# Patient Record
Sex: FEMALE | Race: BLACK OR AFRICAN AMERICAN | NOT HISPANIC OR LATINO | Employment: FULL TIME | URBAN - METROPOLITAN AREA
[De-identification: names, ages, dates, MRNs, and addresses within clinical notes are randomized per-mention and may not be internally consistent; named-entity substitution may affect disease eponyms.]

---

## 2023-04-05 ENCOUNTER — OFFICE VISIT (OUTPATIENT)
Dept: PRIMARY CARE | Facility: CLINIC | Age: 48
End: 2023-04-05
Payer: COMMERCIAL

## 2023-04-05 VITALS — HEART RATE: 76 BPM | DIASTOLIC BLOOD PRESSURE: 76 MMHG | SYSTOLIC BLOOD PRESSURE: 134 MMHG | RESPIRATION RATE: 12 BRPM

## 2023-04-05 DIAGNOSIS — J01.00 ACUTE MAXILLARY SINUSITIS, RECURRENCE NOT SPECIFIED: Primary | ICD-10-CM

## 2023-04-05 DIAGNOSIS — Z00.00 HEALTH CARE MAINTENANCE: ICD-10-CM

## 2023-04-05 DIAGNOSIS — E11.9 TYPE 2 DIABETES MELLITUS WITHOUT COMPLICATION, UNSPECIFIED WHETHER LONG TERM INSULIN USE (MULTI): ICD-10-CM

## 2023-04-05 PROCEDURE — 99213 OFFICE O/P EST LOW 20 MIN: CPT | Performed by: INTERNAL MEDICINE

## 2023-04-05 PROCEDURE — 3078F DIAST BP <80 MM HG: CPT | Performed by: INTERNAL MEDICINE

## 2023-04-05 PROCEDURE — 3075F SYST BP GE 130 - 139MM HG: CPT | Performed by: INTERNAL MEDICINE

## 2023-04-05 RX ORDER — DOXYCYCLINE 100 MG/1
100 CAPSULE ORAL 2 TIMES DAILY
Qty: 14 CAPSULE | Refills: 0 | Status: SHIPPED | OUTPATIENT
Start: 2023-04-05 | End: 2023-04-12

## 2023-04-05 NOTE — PROGRESS NOTES
Subjective   Patient ID: Pebbles Concepcion is a 47 y.o. female who presents for No chief complaint on file..    HPI sick visit same day no chest pain no shortness of breath no side effect with medication blood sugars have been around 100 no fever but has had sinus congestion and buildup no coughing with production bowels have been normal has tried multiple over-the-counter medications without success    Review of Systems    Objective   There were no vitals taken for this visit.    Physical Exam vital signs noted alert and oriented x3 NCAT no coryza nares heavy gray discharge OP benign erythema no exudate mild right-sided maxillary tenderness TM normal bilateral EAC clear bilateral positive AC nodes no JVD chest clear to auscultation CV regular rate and rhythm S1-S2 extremities no clubbing cyanosis or edema normal distal pulses    Assessment/Plan    impression diabetes diagnosis other diagnoses acute maxillary sinusitis  Plan okay for antibiotic take as directed see EMR nasal saline Halls lozenge Mucinex may discontinue any allergy medications does not appear to be allergies at this time monitor blood sugars continue with other medication recheck if no better and for regular visit

## 2023-10-04 PROBLEM — E06.3 HASHIMOTO'S THYROIDITIS: Status: ACTIVE | Noted: 2023-10-04

## 2023-10-04 PROBLEM — E05.00 GRAVES DISEASE: Status: ACTIVE | Noted: 2022-11-02

## 2023-10-04 PROBLEM — R81 GLUCOSURIA: Status: ACTIVE | Noted: 2023-10-04

## 2023-10-04 PROBLEM — R60.0 PERIPHERAL EDEMA: Status: ACTIVE | Noted: 2023-10-04

## 2023-10-04 PROBLEM — M79.643 HAND PAIN: Status: ACTIVE | Noted: 2023-10-04

## 2023-10-04 PROBLEM — R19.7 DIARRHEA: Status: ACTIVE | Noted: 2023-10-04

## 2023-10-04 PROBLEM — G54.0 THORACIC OUTLET SYNDROME: Status: ACTIVE | Noted: 2023-10-04

## 2023-10-04 PROBLEM — R60.9 PERIPHERAL EDEMA: Status: ACTIVE | Noted: 2023-10-04

## 2023-10-04 PROBLEM — E78.5 HYPERLIPIDEMIA: Status: ACTIVE | Noted: 2023-10-04

## 2023-10-04 PROBLEM — J30.2 SEASONAL ALLERGIC RHINITIS: Status: ACTIVE | Noted: 2022-11-02

## 2023-10-04 PROBLEM — D58.2 ELEVATED HEMOGLOBIN (CMS-HCC): Status: ACTIVE | Noted: 2023-10-04

## 2023-10-04 PROBLEM — R82.90 ABNORMAL URINE FINDING: Status: ACTIVE | Noted: 2023-10-04

## 2023-10-04 PROBLEM — H52.03 HYPEROPIA WITH PRESBYOPIA OF BOTH EYES: Status: ACTIVE | Noted: 2023-10-04

## 2023-10-04 PROBLEM — E11.319 DIABETIC RETINOPATHY (MULTI): Status: ACTIVE | Noted: 2023-10-04

## 2023-10-04 PROBLEM — E05.90 HYPERTHYROIDISM: Status: ACTIVE | Noted: 2023-10-04

## 2023-10-04 PROBLEM — E07.9 ASYMMETRICAL THYROID: Status: ACTIVE | Noted: 2023-10-04

## 2023-10-04 PROBLEM — S69.92XA LEFT WRIST INJURY: Status: ACTIVE | Noted: 2023-10-04

## 2023-10-04 PROBLEM — D50.9 ANEMIA, IRON DEFICIENCY: Status: ACTIVE | Noted: 2023-10-04

## 2023-10-04 PROBLEM — H52.11 MYOPIA OF RIGHT EYE: Status: ACTIVE | Noted: 2023-10-04

## 2023-10-04 PROBLEM — N39.0 URINARY TRACT INFECTION: Status: ACTIVE | Noted: 2023-10-04

## 2023-10-04 PROBLEM — E04.0 DIFFUSE GOITER: Status: ACTIVE | Noted: 2023-10-04

## 2023-10-04 PROBLEM — S63.502A LEFT WRIST SPRAIN: Status: ACTIVE | Noted: 2023-10-04

## 2023-10-04 PROBLEM — R53.83 FATIGUE: Status: ACTIVE | Noted: 2023-10-04

## 2023-10-04 PROBLEM — I10 HYPERTENSION: Status: ACTIVE | Noted: 2023-10-04

## 2023-10-04 PROBLEM — N92.0 MENORRHAGIA: Status: ACTIVE | Noted: 2023-10-04

## 2023-10-04 PROBLEM — E55.9 VITAMIN D DEFICIENCY: Status: ACTIVE | Noted: 2023-10-04

## 2023-10-04 PROBLEM — E11.9 TYPE 2 DIABETES MELLITUS (MULTI): Status: ACTIVE | Noted: 2022-11-02

## 2023-10-04 PROBLEM — R20.0 BILATERAL HAND NUMBNESS: Status: ACTIVE | Noted: 2023-10-04

## 2023-10-04 PROBLEM — D64.9 ANEMIA: Status: ACTIVE | Noted: 2023-10-04

## 2023-10-04 PROBLEM — G56.03 BILATERAL CARPAL TUNNEL SYNDROME: Status: ACTIVE | Noted: 2023-10-04

## 2023-10-04 PROBLEM — H52.4 HYPEROPIA WITH PRESBYOPIA OF BOTH EYES: Status: ACTIVE | Noted: 2023-10-04

## 2023-10-04 PROBLEM — E01.0 THYROMEGALY: Status: ACTIVE | Noted: 2023-10-04

## 2023-10-04 RX ORDER — FLUTICASONE PROPIONATE 50 MCG
2 SPRAY, SUSPENSION (ML) NASAL DAILY
COMMUNITY

## 2023-10-04 RX ORDER — BENZONATATE 100 MG/1
100 CAPSULE ORAL 3 TIMES DAILY PRN
COMMUNITY
Start: 2022-11-02

## 2023-10-04 RX ORDER — LORATADINE 10 MG/1
10 TABLET ORAL
COMMUNITY
Start: 2022-11-02 | End: 2023-11-02

## 2023-10-04 RX ORDER — METHIMAZOLE 5 MG/1
5 TABLET ORAL
COMMUNITY

## 2023-10-04 RX ORDER — PIOGLITAZONEHYDROCHLORIDE 45 MG/1
45 TABLET ORAL
COMMUNITY
Start: 2022-10-04 | End: 2024-04-29 | Stop reason: SDUPTHER

## 2023-10-04 RX ORDER — BLOOD-GLUCOSE METER
EACH MISCELLANEOUS
COMMUNITY
Start: 2022-01-14

## 2023-10-04 RX ORDER — PREDNISONE 10 MG/1
1 TABLET ORAL 2 TIMES DAILY
COMMUNITY
Start: 2022-11-10 | End: 2023-10-19 | Stop reason: ALTCHOICE

## 2023-10-04 RX ORDER — PIOGLITAZONEHYDROCHLORIDE 45 MG/1
1 TABLET ORAL DAILY
COMMUNITY
Start: 2017-01-31 | End: 2023-10-19 | Stop reason: SDUPTHER

## 2023-10-04 RX ORDER — SITAGLIPTIN 100 MG/1
1 TABLET, FILM COATED ORAL DAILY
COMMUNITY
Start: 2021-10-21 | End: 2023-10-19

## 2023-10-04 RX ORDER — GLIPIZIDE 5 MG/1
1 TABLET ORAL 2 TIMES DAILY
COMMUNITY
Start: 2022-06-07 | End: 2023-10-19

## 2023-10-04 RX ORDER — METOPROLOL TARTRATE 25 MG/1
1 TABLET, FILM COATED ORAL 2 TIMES DAILY
COMMUNITY
Start: 2021-01-02 | End: 2023-10-19 | Stop reason: ALTCHOICE

## 2023-10-04 RX ORDER — ACETAMINOPHEN 500 MG
500 TABLET ORAL
COMMUNITY
Start: 2002-12-19

## 2023-10-04 RX ORDER — METFORMIN HYDROCHLORIDE 500 MG/1
1000 TABLET ORAL 2 TIMES DAILY
COMMUNITY

## 2023-10-04 RX ORDER — METFORMIN HYDROCHLORIDE 500 MG/1
1000 TABLET ORAL
COMMUNITY
Start: 2022-10-04 | End: 2023-10-19 | Stop reason: SDUPTHER

## 2023-10-04 RX ORDER — BLOOD SUGAR DIAGNOSTIC
STRIP MISCELLANEOUS
COMMUNITY
Start: 2017-11-06 | End: 2023-10-19 | Stop reason: ALTCHOICE

## 2023-10-04 RX ORDER — FUROSEMIDE 20 MG/1
1 TABLET ORAL DAILY
COMMUNITY
Start: 2021-06-22

## 2023-10-04 RX ORDER — LISINOPRIL 10 MG/1
1 TABLET ORAL DAILY
COMMUNITY
Start: 2016-01-15 | End: 2023-10-19 | Stop reason: SINTOL

## 2023-10-18 ENCOUNTER — LAB (OUTPATIENT)
Dept: LAB | Facility: LAB | Age: 48
End: 2023-10-18
Payer: COMMERCIAL

## 2023-10-18 DIAGNOSIS — E11.9 TYPE 2 DIABETES MELLITUS WITHOUT COMPLICATIONS (MULTI): ICD-10-CM

## 2023-10-18 DIAGNOSIS — E05.90 THYROTOXICOSIS, UNSPECIFIED WITHOUT THYROTOXIC CRISIS OR STORM: Primary | ICD-10-CM

## 2023-10-18 LAB
ALBUMIN SERPL BCP-MCNC: 4 G/DL (ref 3.4–5)
ALP SERPL-CCNC: 88 U/L (ref 33–110)
ALT SERPL W P-5'-P-CCNC: 12 U/L (ref 7–45)
ANION GAP SERPL CALC-SCNC: 13 MMOL/L (ref 10–20)
AST SERPL W P-5'-P-CCNC: 15 U/L (ref 9–39)
BILIRUB SERPL-MCNC: 0.3 MG/DL (ref 0–1.2)
BUN SERPL-MCNC: 20 MG/DL (ref 6–23)
CALCIUM SERPL-MCNC: 9.1 MG/DL (ref 8.6–10.3)
CHLORIDE SERPL-SCNC: 100 MMOL/L (ref 98–107)
CHOLEST SERPL-MCNC: 200 MG/DL (ref 0–199)
CHOLESTEROL/HDL RATIO: 2.3
CO2 SERPL-SCNC: 28 MMOL/L (ref 21–32)
CREAT SERPL-MCNC: 0.7 MG/DL (ref 0.5–1.05)
EST. AVERAGE GLUCOSE BLD GHB EST-MCNC: 249 MG/DL
GFR SERPL CREATININE-BSD FRML MDRD: >90 ML/MIN/1.73M*2
GLUCOSE SERPL-MCNC: 233 MG/DL (ref 74–99)
HBA1C MFR BLD: 10.3 %
HDLC SERPL-MCNC: 87.3 MG/DL
LDLC SERPL CALC-MCNC: 103 MG/DL
NON HDL CHOLESTEROL: 113 MG/DL (ref 0–149)
POTASSIUM SERPL-SCNC: 4.1 MMOL/L (ref 3.5–5.3)
PROT SERPL-MCNC: 7.1 G/DL (ref 6.4–8.2)
SODIUM SERPL-SCNC: 137 MMOL/L (ref 136–145)
T4 FREE SERPL-MCNC: 1.03 NG/DL (ref 0.61–1.12)
TRIGL SERPL-MCNC: 50 MG/DL (ref 0–149)
TSH SERPL-ACNC: 3.05 MIU/L (ref 0.44–3.98)
VLDL: 10 MG/DL (ref 0–40)

## 2023-10-18 PROCEDURE — 84443 ASSAY THYROID STIM HORMONE: CPT

## 2023-10-18 PROCEDURE — 84445 ASSAY OF TSI GLOBULIN: CPT

## 2023-10-18 PROCEDURE — 36415 COLL VENOUS BLD VENIPUNCTURE: CPT

## 2023-10-18 PROCEDURE — 80061 LIPID PANEL: CPT

## 2023-10-18 PROCEDURE — 84480 ASSAY TRIIODOTHYRONINE (T3): CPT

## 2023-10-18 PROCEDURE — 83036 HEMOGLOBIN GLYCOSYLATED A1C: CPT

## 2023-10-18 PROCEDURE — 80053 COMPREHEN METABOLIC PANEL: CPT

## 2023-10-18 PROCEDURE — 84439 ASSAY OF FREE THYROXINE: CPT

## 2023-10-18 RX ORDER — LANCETS
EACH MISCELLANEOUS
COMMUNITY

## 2023-10-19 ENCOUNTER — OFFICE VISIT (OUTPATIENT)
Dept: ENDOCRINOLOGY | Facility: HOSPITAL | Age: 48
End: 2023-10-19
Payer: COMMERCIAL

## 2023-10-19 VITALS
SYSTOLIC BLOOD PRESSURE: 114 MMHG | HEIGHT: 62 IN | WEIGHT: 159 LBS | TEMPERATURE: 97.4 F | DIASTOLIC BLOOD PRESSURE: 67 MMHG | BODY MASS INDEX: 29.26 KG/M2 | HEART RATE: 90 BPM | OXYGEN SATURATION: 90 %

## 2023-10-19 DIAGNOSIS — E05.00 GRAVES DISEASE: ICD-10-CM

## 2023-10-19 DIAGNOSIS — E11.9 TYPE 2 DIABETES MELLITUS WITHOUT COMPLICATION, WITHOUT LONG-TERM CURRENT USE OF INSULIN (MULTI): Primary | ICD-10-CM

## 2023-10-19 LAB
GLUCOSE BLD MANUAL STRIP-MCNC: 283 MG/DL (ref 74–99)
T3 SERPL-MCNC: 113 NG/DL (ref 60–200)

## 2023-10-19 PROCEDURE — 3078F DIAST BP <80 MM HG: CPT | Performed by: STUDENT IN AN ORGANIZED HEALTH CARE EDUCATION/TRAINING PROGRAM

## 2023-10-19 PROCEDURE — 36416 COLLJ CAPILLARY BLOOD SPEC: CPT | Performed by: STUDENT IN AN ORGANIZED HEALTH CARE EDUCATION/TRAINING PROGRAM

## 2023-10-19 PROCEDURE — 82947 ASSAY GLUCOSE BLOOD QUANT: CPT | Performed by: STUDENT IN AN ORGANIZED HEALTH CARE EDUCATION/TRAINING PROGRAM

## 2023-10-19 PROCEDURE — 3046F HEMOGLOBIN A1C LEVEL >9.0%: CPT | Performed by: STUDENT IN AN ORGANIZED HEALTH CARE EDUCATION/TRAINING PROGRAM

## 2023-10-19 PROCEDURE — 3049F LDL-C 100-129 MG/DL: CPT | Performed by: STUDENT IN AN ORGANIZED HEALTH CARE EDUCATION/TRAINING PROGRAM

## 2023-10-19 PROCEDURE — 3074F SYST BP LT 130 MM HG: CPT | Performed by: STUDENT IN AN ORGANIZED HEALTH CARE EDUCATION/TRAINING PROGRAM

## 2023-10-19 PROCEDURE — 99215 OFFICE O/P EST HI 40 MIN: CPT | Performed by: STUDENT IN AN ORGANIZED HEALTH CARE EDUCATION/TRAINING PROGRAM

## 2023-10-19 RX ORDER — LANCETS
EACH MISCELLANEOUS
Qty: 90 EACH | Refills: 11 | Status: SHIPPED | OUTPATIENT
Start: 2023-10-19

## 2023-10-19 RX ORDER — BLOOD SUGAR DIAGNOSTIC
STRIP MISCELLANEOUS
Qty: 90 STRIP | Refills: 11 | Status: SHIPPED | OUTPATIENT
Start: 2023-10-19

## 2023-10-19 RX ORDER — SEMAGLUTIDE 1.34 MG/ML
INJECTION, SOLUTION SUBCUTANEOUS
Qty: 0.01 G | Refills: 0 | Status: SHIPPED | OUTPATIENT
Start: 2023-10-19 | End: 2023-12-18

## 2023-10-19 RX ORDER — DEXTROSE 4 G
TABLET,CHEWABLE ORAL
Qty: 1 EACH | Refills: 0 | Status: SHIPPED | OUTPATIENT
Start: 2023-10-19

## 2023-10-19 SDOH — ECONOMIC STABILITY: FOOD INSECURITY: WITHIN THE PAST 12 MONTHS, YOU WORRIED THAT YOUR FOOD WOULD RUN OUT BEFORE YOU GOT MONEY TO BUY MORE.: NEVER TRUE

## 2023-10-19 SDOH — ECONOMIC STABILITY: FOOD INSECURITY: WITHIN THE PAST 12 MONTHS, THE FOOD YOU BOUGHT JUST DIDN'T LAST AND YOU DIDN'T HAVE MONEY TO GET MORE.: NEVER TRUE

## 2023-10-19 ASSESSMENT — ENCOUNTER SYMPTOMS
NECK PAIN: 0
POLYDIPSIA: 0
WHEEZING: 0
EYE DISCHARGE: 0
OCCASIONAL FEELINGS OF UNSTEADINESS: 0
FLANK PAIN: 0
FREQUENCY: 0
COUGH: 0
DIFFICULTY URINATING: 0
ACTIVITY CHANGE: 1
VOMITING: 0
ABDOMINAL DISTENTION: 0
LIGHT-HEADEDNESS: 0
FATIGUE: 0
NAUSEA: 0
ABDOMINAL PAIN: 0
DIARRHEA: 0
CONFUSION: 0
NECK STIFFNESS: 0
CONSTIPATION: 0
APPETITE CHANGE: 0
NUMBNESS: 0
POLYPHAGIA: 0
SHORTNESS OF BREATH: 0
DIZZINESS: 0
AGITATION: 0
DEPRESSION: 0
CHEST TIGHTNESS: 0
CHILLS: 0
LOSS OF SENSATION IN FEET: 0
HALLUCINATIONS: 0
TREMORS: 0
COLOR CHANGE: 0

## 2023-10-19 ASSESSMENT — LIFESTYLE VARIABLES
AUDIT-C TOTAL SCORE: 0
HOW MANY STANDARD DRINKS CONTAINING ALCOHOL DO YOU HAVE ON A TYPICAL DAY: PATIENT DOES NOT DRINK
HOW OFTEN DO YOU HAVE SIX OR MORE DRINKS ON ONE OCCASION: NEVER
HOW OFTEN DO YOU HAVE A DRINK CONTAINING ALCOHOL: NEVER
SKIP TO QUESTIONS 9-10: 1

## 2023-10-19 ASSESSMENT — PATIENT HEALTH QUESTIONNAIRE - PHQ9
SUM OF ALL RESPONSES TO PHQ9 QUESTIONS 1 & 2: 0
2. FEELING DOWN, DEPRESSED OR HOPELESS: NOT AT ALL
1. LITTLE INTEREST OR PLEASURE IN DOING THINGS: NOT AT ALL

## 2023-10-19 ASSESSMENT — PAIN SCALES - GENERAL: PAINLEVEL: 0-NO PAIN

## 2023-10-19 NOTE — PROGRESS NOTES
Subjective   Pebbles Concepcion is a 48 y.o. female who presents for follow up for Type 2 diabetes mellitus and hyperthyroidism  Lab Results   Component Value Date    HGBA1C 10.3 (H) 10/18/2023      Mrs. Concepcion is a 48 year old lady with history of diabetes mellitus type 2 hyperlipidemia low vitamin D hypertension who was referred by Dr. Ok Walker for evaluation of goiter and abnormal TFTs coming for follow up.    # Graves Disease  Was started on methimazole in July 2021. at that time TSH < 0.01 and FT4: 3.13 and T3: 237 TSI was 3.9  Was on methimazole 20 mg BID and metop 25 mg BID   Labs done in October showed a TSH < 0.01, FT4: 0.87 and FT4: 104.  Was on methimazole 10 mg 2 tabs in am and 1 in pm since October.  Blood tests TSH: 111 and FT4: < 0.25 on Friday May 3rd so we asked patient to hold methimazole  Methimazole was resumed in June patient did not follow-up until November blood work in November on methimazole 10 mg was TSH of 0.43 free T4 of 0.77 and T3 of 110 TSI was 2.4 we decrease methimazole to 5 mg repeated after 6 weeks TSH was 2.58 Free T4 0.85 and T3 123    Currently on methimazole 2.5 mg. TSH: 3.05, FT4: 1.03, T3: 113 TSI pending  Energy: low feels tired most of the time  Ran out of Januvia ( couple month)  Hasn't been eating well recently has been cutting down on Junk  Gained 16 lbs   No palpitations  No tremors  Periods twice a year  BM okay   Follows with ophthalmology joycelyn  Not seeing well   Has peripheral vision only in the left and right is her primary eye for vision and currently blurry      # T2DM  Diabetic a1c: 10.3 % on metformin 1000 mg BID and pioglitazone 45 mg  Does not check BG at home.  No frequent yeat infection No hx of UTI  Hx of pancreatitis years and years ago (discussed with patient risk with GLP1RA)  Diet:   Breakfast: Eggs with meat cereal  Lunch: salad or burger ot chicken sandwich  Dinner: chicken fish or pasta  Recently has been eating cookies cake (due to bday  "event)  Last ophthalmology recent  Numbness and tingling in her hands the whole hands sometimes.  Improved from before  Has been on lisinopril for years stopped in June cough started afterwards now no cough    Review of Systems   Constitutional:  Positive for activity change. Negative for appetite change, chills and fatigue.   HENT:  Negative for congestion.    Eyes:  Negative for discharge.   Respiratory:  Negative for cough, chest tightness, shortness of breath and wheezing.    Cardiovascular:  Negative for chest pain.   Gastrointestinal:  Negative for abdominal distention, abdominal pain, constipation, diarrhea, nausea and vomiting.   Endocrine: Negative for cold intolerance, heat intolerance, polydipsia, polyphagia and polyuria.   Genitourinary:  Negative for difficulty urinating, flank pain, frequency and urgency.   Musculoskeletal:  Negative for neck pain and neck stiffness.   Skin:  Negative for color change and rash.   Neurological:  Negative for dizziness, tremors, syncope, light-headedness and numbness.   Psychiatric/Behavioral:  Negative for agitation, confusion and hallucinations.        Objective   /67 (BP Location: Left arm, Patient Position: Sitting, BP Cuff Size: Adult)   Pulse 90   Temp 36.3 °C (97.4 °F) (Temporal)   Ht 1.575 m (5' 2\")   Wt 72.1 kg (159 lb)   SpO2 90%   BMI 29.08 kg/m²   Physical Exam  Constitutional:       General: She is not in acute distress.     Appearance: Normal appearance.   Eyes:      Extraocular Movements: Extraocular movements intact.      Pupils: Pupils are equal, round, and reactive to light.   Neck:      Comments: ~ 25 g rubbery thyroid no palpable nodules  Cardiovascular:      Rate and Rhythm: Normal rate and regular rhythm.   Pulmonary:      Effort: Pulmonary effort is normal. No respiratory distress.      Breath sounds: Normal breath sounds.   Abdominal:      General: Bowel sounds are normal.      Palpations: Abdomen is soft.      Tenderness: There is no " "abdominal tenderness.   Skin:     Coloration: Skin is not jaundiced or pale.      Findings: No erythema or rash.   Neurological:      General: No focal deficit present.      Mental Status: She is alert and oriented to person, place, and time.      Deep Tendon Reflexes: Reflexes normal.   Psychiatric:         Mood and Affect: Mood normal.         Behavior: Behavior normal.       Lab Review  Glucose (mg/dL)   Date Value   10/18/2023 233 (H)   12/31/2022 178 (H)   11/10/2022 134 (H)   06/17/2022 121 (H)     Hemoglobin A1C (%)   Date Value   10/18/2023 10.3 (H)   06/03/2022 12.9 (A)   10/15/2021 9.4 (A)   04/07/2021 7.4     Bicarbonate (mmol/L)   Date Value   10/18/2023 28   12/31/2022 27   11/10/2022 25   06/17/2022 27     Urea Nitrogen (mg/dL)   Date Value   10/18/2023 20   12/31/2022 14   11/10/2022 12   06/17/2022 21     Creatinine (mg/dL)   Date Value   10/18/2023 0.70   12/31/2022 0.73   11/10/2022 0.61   06/17/2022 0.95     Lab Results   Component Value Date    CHOL 200 (H) 10/18/2023    CHOL 160 11/19/2020    CHOL 225 (H) 09/20/2019     Lab Results   Component Value Date    HDL 87.3 10/18/2023    HDL 81.6 11/19/2020    HDL 85.4 09/20/2019     Lab Results   Component Value Date    LDLCALC 103 (H) 10/18/2023     Lab Results   Component Value Date    TRIG 50 10/18/2023    TRIG 60 11/19/2020    TRIG 44 09/20/2019     No components found for: \"CHOLHDL\"   Lab Results   Component Value Date    TSH 3.05 10/18/2023     No results found for: \"ALBUR\", \"[id number]\"     Health Maintenance:       Assessment/Plan   Mrs. Concepcion is a 48 year old lady with history of diabetes mellitus type 2 hyperlipidemia low vitamin D hypertension who was referred by Dr. Ok Walker for evaluation of goiter and abnormal TFTs coming for follow up.    # Graves Disease  Was started on methimazole in July 2021. at that time TSH < 0.01 and FT4: 3.13 and T3: 237 TSI was 3.9  Was on methimazole 20 mg BID and metop 25 mg BID   Labs done in October showed " a TSH < 0.01, FT4: 0.87 and FT4: 104.  Was on methimazole 10 mg 2 tabs in am and 1 in pm since October.  Blood tests TSH: 111 and FT4: < 0.25 on Friday May 3rd so we asked patient to hold methimazole  Methimazole was resumed in June patient did not follow-up until November blood work in November on methimazole 10 mg was TSH of 0.43 free T4 of 0.77 and T3 of 110 TSI was 2.4 we decrease methimazole to 5 mg repeated after 6 weeks TSH was 2.58 Free T4 0.85 and T3 123    Currently on methimazole 2.5 mg. TSH: 3.05, FT4: 1.03, T3: 113 TSI pending  Gained 16 lbs   Clinically euthyroid  Plan:  Decrease methimazole to 2.5 mg 4 times weekly.  If TSI is negative we will stop it and repeat in 4 weeks  If TSI still positive we will repeat in 3 months    # T2DM  Diabetic a1c: 10.3 % on metformin 1000 mg BID and pioglitazone 45 mg  Ran out of JAnuvia  Does not check BG at home.  No frequent yeast infection No hx of UTI  Hx of pancreatitis years and years ago (discussed with patient risk with GLP1RA)  Last ophthalmology recent  Numbness and tingling in her hands the whole hands sometimes.  Improved from before  Was been on lisinopril for years stopped in June cough started afterwards now no cough  Plan:  Continue Metformin and pioglitazone  Start Ozempic 0.25 mg once weekly injection week 1 through week 4 if tolerated increase to 0.5 mg weekly during week 5 to week 8, if tolerated increase to 1mg week 9 and continue with 1 mg until seen  If ozempic is not approved start trulicity 0.75 mg once weekly  Start Jardiance 25 mg daily  Check BG 2-3 times a day  Continue to follow with ophthalmology    Blood work in 3 months  Follow up in 3 months

## 2023-10-19 NOTE — PATIENT INSTRUCTIONS
For your thyroid  Decrease methimazole to 2.5 mg 4 times weekly.  If TSI is negative we will stop it and repeat in 4 weeks  If TSI still positive we will repeat in 3 months    For your Diabetes  Continue Metformin and pioglitazone  Start Ozempic 0.25 mg once weekly injection week 1 through week 4 if tolerated increase to 0.5 mg weekly during week 5 to week 8, if tolerated increase to 1mg week 9 and continue with 1 mg until seen  If ozempic is not approved start trulicity 0.75 mg once weekly  Start Jardiance 25 mg daily  Check BG 2-3 times a day  Continue to follow with ophthalmology    Blood work in 3 months  Follow up in 3 months

## 2023-10-23 DIAGNOSIS — E11.9 TYPE 2 DIABETES MELLITUS WITHOUT COMPLICATION, WITHOUT LONG-TERM CURRENT USE OF INSULIN (MULTI): ICD-10-CM

## 2023-10-24 RX ORDER — SEMAGLUTIDE 0.68 MG/ML
INJECTION, SOLUTION SUBCUTANEOUS
Qty: 3 ML | Refills: 0 | OUTPATIENT
Start: 2023-10-24

## 2023-10-25 LAB — TSI SER-ACNC: <1 TSI INDEX

## 2023-11-07 ENCOUNTER — TELEPHONE (OUTPATIENT)
Dept: ENDOCRINOLOGY | Facility: HOSPITAL | Age: 48
End: 2023-11-07
Payer: COMMERCIAL

## 2023-11-07 DIAGNOSIS — E05.00 GRAVES DISEASE: Primary | ICD-10-CM

## 2023-11-07 NOTE — TELEPHONE ENCOUNTER
Left voicemail to deliver attached message from md    ----- Message from Bassem Latham MD sent at 11/7/2023  3:47 PM EST -----  Please inform patient to stop her methimazole and repeat blood work in 4 weeks

## 2023-11-27 ENCOUNTER — APPOINTMENT (OUTPATIENT)
Dept: OPHTHALMOLOGY | Facility: CLINIC | Age: 48
End: 2023-11-27
Payer: COMMERCIAL

## 2023-12-01 DIAGNOSIS — E11.9 TYPE 2 DIABETES MELLITUS WITHOUT COMPLICATION, UNSPECIFIED WHETHER LONG TERM INSULIN USE (MULTI): ICD-10-CM

## 2023-12-05 RX ORDER — SEMAGLUTIDE 0.68 MG/ML
0.5 INJECTION, SOLUTION SUBCUTANEOUS
Qty: 3 ML | Refills: 3 | Status: SHIPPED | OUTPATIENT
Start: 2023-12-05 | End: 2024-03-07 | Stop reason: ALTCHOICE

## 2023-12-05 RX ORDER — PEN NEEDLE, DIABETIC 30 GX3/16"
1 NEEDLE, DISPOSABLE MISCELLANEOUS
Qty: 30 EACH | Refills: 3 | Status: SHIPPED | OUTPATIENT
Start: 2023-12-05

## 2024-01-15 ENCOUNTER — APPOINTMENT (OUTPATIENT)
Dept: PRIMARY CARE | Facility: CLINIC | Age: 49
End: 2024-01-15
Payer: COMMERCIAL

## 2024-01-22 ENCOUNTER — APPOINTMENT (OUTPATIENT)
Dept: ENDOCRINOLOGY | Facility: HOSPITAL | Age: 49
End: 2024-01-22
Payer: COMMERCIAL

## 2024-02-05 ENCOUNTER — OFFICE VISIT (OUTPATIENT)
Dept: PRIMARY CARE | Facility: CLINIC | Age: 49
End: 2024-02-05
Payer: COMMERCIAL

## 2024-02-05 VITALS — DIASTOLIC BLOOD PRESSURE: 72 MMHG | BODY MASS INDEX: 27.44 KG/M2 | WEIGHT: 150 LBS | SYSTOLIC BLOOD PRESSURE: 126 MMHG

## 2024-02-05 DIAGNOSIS — J01.00 ACUTE NON-RECURRENT MAXILLARY SINUSITIS: ICD-10-CM

## 2024-02-05 DIAGNOSIS — R05.3 CHRONIC COUGH: ICD-10-CM

## 2024-02-05 DIAGNOSIS — Z00.00 HEALTH CARE MAINTENANCE: Primary | ICD-10-CM

## 2024-02-05 PROCEDURE — 3078F DIAST BP <80 MM HG: CPT | Performed by: INTERNAL MEDICINE

## 2024-02-05 PROCEDURE — 99213 OFFICE O/P EST LOW 20 MIN: CPT | Performed by: INTERNAL MEDICINE

## 2024-02-05 PROCEDURE — 3074F SYST BP LT 130 MM HG: CPT | Performed by: INTERNAL MEDICINE

## 2024-02-05 RX ORDER — AZITHROMYCIN 250 MG/1
TABLET, FILM COATED ORAL
Qty: 6 TABLET | Refills: 0 | Status: SHIPPED | OUTPATIENT
Start: 2024-02-05 | End: 2024-02-10

## 2024-02-05 NOTE — PROGRESS NOTES
Subjective   Patient ID: Pebbles Concepcion is a 48 y.o. female who presents for No chief complaint on file..    HPI follow-up visit and sick visit same day after hours no staff no chest pain no shortness of breath 6+ weeks of coughing function infection, review of the images been coughing some sinus has been using present diet.  Close on Jardiance metformin has lost weight no polyuria polydipsia has blood work pending has not tried much else of over-the-counter medications except for lozenges bowels normal no dysuria    Review of Systems    Objective   There were no vitals taken for this visit.    Physical Exam vital signs noted alert and oriented x 3 NCAT no coryza nares heavy clear discharge OP benign TM normal bilateral EAC clear bilateral no AC nodes no JVD mild goiter chest clear to auscultation  Weak upper bronchial breath sounds without wheezing CV regular rate and rhythm S1-S2 extremities no clubbing cyanosis or edema normal distal pulses    Assessment/Plan impression cough acute on chronic maxillary sinusitis or sinobronchitis other diagnoses plan okay for Mucinex or throat lozenges increase water consumption okay for prescription Zithromax 250 mg tablet take 2 tabs first day 1 patient next 4 days #1 traditional Z-Nickolas and 0 refills continue with other medications follow-up for blood work follow-up with endocrinology then recheck for regular visit Endor if no better and/or ENT and/or films

## 2024-03-04 ENCOUNTER — LAB (OUTPATIENT)
Dept: LAB | Facility: LAB | Age: 49
End: 2024-03-04
Payer: COMMERCIAL

## 2024-03-04 DIAGNOSIS — E05.00 GRAVES DISEASE: ICD-10-CM

## 2024-03-04 DIAGNOSIS — E11.9 TYPE 2 DIABETES MELLITUS WITHOUT COMPLICATION, WITHOUT LONG-TERM CURRENT USE OF INSULIN (MULTI): ICD-10-CM

## 2024-03-04 LAB
ALBUMIN SERPL BCP-MCNC: 3.9 G/DL (ref 3.4–5)
ANION GAP SERPL CALC-SCNC: 13 MMOL/L (ref 10–20)
BUN SERPL-MCNC: 18 MG/DL (ref 6–23)
CALCIUM SERPL-MCNC: 9.4 MG/DL (ref 8.6–10.3)
CHLORIDE SERPL-SCNC: 100 MMOL/L (ref 98–107)
CO2 SERPL-SCNC: 28 MMOL/L (ref 21–32)
CREAT SERPL-MCNC: 0.7 MG/DL (ref 0.5–1.05)
EGFRCR SERPLBLD CKD-EPI 2021: >90 ML/MIN/1.73M*2
GLUCOSE SERPL-MCNC: 144 MG/DL (ref 74–99)
PHOSPHATE SERPL-MCNC: 4.1 MG/DL (ref 2.5–4.9)
POTASSIUM SERPL-SCNC: 4.3 MMOL/L (ref 3.5–5.3)
SODIUM SERPL-SCNC: 137 MMOL/L (ref 136–145)
T4 FREE SERPL-MCNC: 0.94 NG/DL (ref 0.61–1.12)
TSH SERPL-ACNC: 3.42 MIU/L (ref 0.44–3.98)

## 2024-03-04 PROCEDURE — 80069 RENAL FUNCTION PANEL: CPT

## 2024-03-04 PROCEDURE — 84480 ASSAY TRIIODOTHYRONINE (T3): CPT

## 2024-03-04 PROCEDURE — 84439 ASSAY OF FREE THYROXINE: CPT

## 2024-03-04 PROCEDURE — 83036 HEMOGLOBIN GLYCOSYLATED A1C: CPT

## 2024-03-04 PROCEDURE — 36415 COLL VENOUS BLD VENIPUNCTURE: CPT

## 2024-03-04 PROCEDURE — 84443 ASSAY THYROID STIM HORMONE: CPT

## 2024-03-05 ENCOUNTER — LAB (OUTPATIENT)
Dept: LAB | Facility: LAB | Age: 49
End: 2024-03-05
Payer: COMMERCIAL

## 2024-03-05 DIAGNOSIS — E05.00 GRAVES DISEASE: ICD-10-CM

## 2024-03-05 DIAGNOSIS — E11.9 TYPE 2 DIABETES MELLITUS WITHOUT COMPLICATION, WITHOUT LONG-TERM CURRENT USE OF INSULIN (MULTI): ICD-10-CM

## 2024-03-05 LAB
CREAT UR-MCNC: 36 MG/DL (ref 20–320)
EST. AVERAGE GLUCOSE BLD GHB EST-MCNC: 146 MG/DL
HBA1C MFR BLD: 6.7 %
MICROALBUMIN UR-MCNC: 7.9 MG/L
MICROALBUMIN/CREAT UR: 21.9 UG/MG CREAT
T3 SERPL-MCNC: 93 NG/DL (ref 60–200)

## 2024-03-05 PROCEDURE — 82043 UR ALBUMIN QUANTITATIVE: CPT

## 2024-03-05 PROCEDURE — 82570 ASSAY OF URINE CREATININE: CPT

## 2024-03-07 ENCOUNTER — OFFICE VISIT (OUTPATIENT)
Dept: ENDOCRINOLOGY | Facility: HOSPITAL | Age: 49
End: 2024-03-07
Payer: COMMERCIAL

## 2024-03-07 VITALS
SYSTOLIC BLOOD PRESSURE: 154 MMHG | DIASTOLIC BLOOD PRESSURE: 83 MMHG | WEIGHT: 148.37 LBS | HEART RATE: 81 BPM | OXYGEN SATURATION: 100 % | BODY MASS INDEX: 27.3 KG/M2 | TEMPERATURE: 97.2 F | HEIGHT: 62 IN

## 2024-03-07 DIAGNOSIS — E05.00 GRAVES DISEASE: ICD-10-CM

## 2024-03-07 DIAGNOSIS — E11.9 TYPE 2 DIABETES MELLITUS WITHOUT COMPLICATION, WITHOUT LONG-TERM CURRENT USE OF INSULIN (MULTI): Primary | ICD-10-CM

## 2024-03-07 PROBLEM — S63.509A SPRAIN OF WRIST: Status: ACTIVE | Noted: 2024-03-07

## 2024-03-07 PROBLEM — J45.909 ASTHMATIC BRONCHITIS (HHS-HCC): Status: ACTIVE | Noted: 2024-03-07

## 2024-03-07 PROBLEM — H54.40 BLINDNESS OF LEFT EYE: Status: ACTIVE | Noted: 2024-03-07

## 2024-03-07 PROBLEM — S69.90XA INJURY OF WRIST: Status: ACTIVE | Noted: 2024-03-07

## 2024-03-07 PROBLEM — Z78.9 OTHER SPECIFIED HEALTH STATUS: Status: ACTIVE | Noted: 2024-03-07

## 2024-03-07 PROBLEM — M79.643 PAIN OF HAND: Status: ACTIVE | Noted: 2024-03-07

## 2024-03-07 PROBLEM — R05.9 COUGH: Status: ACTIVE | Noted: 2023-01-06

## 2024-03-07 PROBLEM — J32.9 SINUSITIS: Status: ACTIVE | Noted: 2024-03-07

## 2024-03-07 PROBLEM — R81 GLYCOSURIA: Status: ACTIVE | Noted: 2024-03-07

## 2024-03-07 PROBLEM — H33.20: Status: ACTIVE | Noted: 2024-03-07

## 2024-03-07 PROBLEM — K21.9 GASTROESOPHAGEAL REFLUX DISEASE: Status: ACTIVE | Noted: 2024-03-07

## 2024-03-07 PROBLEM — H25.10 NUCLEAR SCLEROSIS: Status: ACTIVE | Noted: 2024-03-07

## 2024-03-07 PROBLEM — R20.0 NUMBNESS OF HAND: Status: ACTIVE | Noted: 2024-03-07

## 2024-03-07 PROBLEM — J01.00 ACUTE MAXILLARY SINUSITIS: Status: ACTIVE | Noted: 2024-03-07

## 2024-03-07 PROBLEM — E07.9 DISORDER OF THYROID: Status: ACTIVE | Noted: 2024-03-07

## 2024-03-07 PROBLEM — G56.00 CARPAL TUNNEL SYNDROME: Status: ACTIVE | Noted: 2024-03-07

## 2024-03-07 PROBLEM — Z98.890 HISTORY OF PANRETINAL PHOTOCOAGULATION: Status: ACTIVE | Noted: 2024-03-07

## 2024-03-07 PROBLEM — Z98.890 HISTORY OF VITRECTOMY: Status: ACTIVE | Noted: 2024-03-07

## 2024-03-07 PROBLEM — H35.89 RETINAL MACULAR ATROPHY: Status: ACTIVE | Noted: 2024-03-07

## 2024-03-07 LAB — GLUCOSE BLD MANUAL STRIP-MCNC: 88 MG/DL (ref 74–99)

## 2024-03-07 PROCEDURE — 3061F NEG MICROALBUMINURIA REV: CPT | Performed by: STUDENT IN AN ORGANIZED HEALTH CARE EDUCATION/TRAINING PROGRAM

## 2024-03-07 PROCEDURE — 82947 ASSAY GLUCOSE BLOOD QUANT: CPT | Performed by: STUDENT IN AN ORGANIZED HEALTH CARE EDUCATION/TRAINING PROGRAM

## 2024-03-07 PROCEDURE — 36416 COLLJ CAPILLARY BLOOD SPEC: CPT | Performed by: STUDENT IN AN ORGANIZED HEALTH CARE EDUCATION/TRAINING PROGRAM

## 2024-03-07 PROCEDURE — 4010F ACE/ARB THERAPY RXD/TAKEN: CPT | Performed by: STUDENT IN AN ORGANIZED HEALTH CARE EDUCATION/TRAINING PROGRAM

## 2024-03-07 PROCEDURE — 99215 OFFICE O/P EST HI 40 MIN: CPT | Performed by: STUDENT IN AN ORGANIZED HEALTH CARE EDUCATION/TRAINING PROGRAM

## 2024-03-07 PROCEDURE — 3077F SYST BP >= 140 MM HG: CPT | Performed by: STUDENT IN AN ORGANIZED HEALTH CARE EDUCATION/TRAINING PROGRAM

## 2024-03-07 PROCEDURE — 3079F DIAST BP 80-89 MM HG: CPT | Performed by: STUDENT IN AN ORGANIZED HEALTH CARE EDUCATION/TRAINING PROGRAM

## 2024-03-07 PROCEDURE — 3044F HG A1C LEVEL LT 7.0%: CPT | Performed by: STUDENT IN AN ORGANIZED HEALTH CARE EDUCATION/TRAINING PROGRAM

## 2024-03-07 RX ORDER — PREDNISONE 10 MG/1
10 TABLET ORAL 2 TIMES DAILY
COMMUNITY
Start: 2022-11-10

## 2024-03-07 RX ORDER — METOPROLOL TARTRATE 25 MG/1
25 TABLET, FILM COATED ORAL 2 TIMES DAILY
COMMUNITY
Start: 2021-01-02

## 2024-03-07 RX ORDER — SEMAGLUTIDE 1.34 MG/ML
1 INJECTION, SOLUTION SUBCUTANEOUS
Qty: 3 ML | Refills: 3 | Status: SHIPPED | OUTPATIENT
Start: 2024-03-07

## 2024-03-07 RX ORDER — EMPAGLIFLOZIN 10 MG/1
10 TABLET, FILM COATED ORAL DAILY
COMMUNITY
Start: 2023-10-19

## 2024-03-07 RX ORDER — GLIPIZIDE 5 MG/1
5 TABLET ORAL EVERY 12 HOURS
COMMUNITY
Start: 2022-06-07

## 2024-03-07 RX ORDER — LISINOPRIL 10 MG/1
10 TABLET ORAL DAILY
COMMUNITY
Start: 2016-01-15

## 2024-03-07 RX ORDER — AMOXICILLIN AND CLAVULANATE POTASSIUM 875; 125 MG/1; MG/1
875 TABLET, FILM COATED ORAL 2 TIMES DAILY
COMMUNITY
Start: 2023-12-05

## 2024-03-07 ASSESSMENT — ENCOUNTER SYMPTOMS
OCCASIONAL FEELINGS OF UNSTEADINESS: 0
DEPRESSION: 0
LOSS OF SENSATION IN FEET: 0

## 2024-03-07 ASSESSMENT — PATIENT HEALTH QUESTIONNAIRE - PHQ9
1. LITTLE INTEREST OR PLEASURE IN DOING THINGS: NOT AT ALL
SUM OF ALL RESPONSES TO PHQ9 QUESTIONS 1 AND 2: 0
2. FEELING DOWN, DEPRESSED OR HOPELESS: NOT AT ALL

## 2024-03-07 ASSESSMENT — PAIN SCALES - GENERAL: PAINLEVEL: 0-NO PAIN

## 2024-03-07 NOTE — PROGRESS NOTES
Patient coming in for follow up for T2DM    Subjective   Pebbles Concepcion is a 48 y.o. female who presents for follow up for Type 2 diabetes mellitus.     Lab Results   Component Value Date    HGBA1C 6.7 (H) 03/04/2024      Mrs. Concepcion is a 48 year old lady with history of diabetes mellitus type 2 hyperlipidemia low vitamin D hypertension who was referred by Dr. Ok Walker for evaluation of goiter and abnormal TFTs coming for follow up.     Has been having a cough   Meds: Metformin, Jardiance, Pioglitazone and Ozempic    # Graves Disease  Was started on methimazole in July 2021. at that time TSH < 0.01 and FT4: 3.13 and T3: 237 TSI was 3.9  Was on methimazole 20 mg BID and metop 25 mg BID   Labs done in October showed a TSH < 0.01, FT4: 0.87 and FT4: 104.  Was on methimazole 10 mg 2 tabs in am and 1 in pm since October.  Blood tests TSH: 111 and FT4: < 0.25 on Friday May 3rd so we asked patient to hold methimazole  Methimazole was resumed in June patient did not follow-up until November blood work in November on methimazole 10 mg was TSH of 0.43 free T4 of 0.77 and T3 of 110 TSI was 2.4 we decrease methimazole to 5 mg repeated after 6 weeks TSH was 2.58 Free T4 0.85 and T3 123     Currently on methimazole 2.5 mg. TSH: 3.05, FT4: 1.03, T3: 113 TSI pending    November 7th stopped methimazole  Lab Results   Component Value Date    TSH 3.42 03/04/2024    FREET4 0.94 03/04/2024    I1DVMMF 93 03/04/2024    RECE 4.54 (H) 11/10/2022    THYROIDPAB >1000 (A) 04/07/2021    TSI <1.0 10/18/2023       No dysphagia  No compressive symptoms  Cough     # T2DM  Diabetic a1c: 10.3 % on metformin 1000 mg BID and pioglitazone 45 mg  We saw her in October and we started Jardiance and ozempic  Lost 11lbs   In the morning < 100   During the day 120-130  No frequent yeast infection No hx of UTI  No urinary symptoms  No nausea or vomiting  No abdominal pain  Hx of pancreatitis years and years ago (discussed with patient risk with  "GLP1RA)  Last ophthalmology in September 2023  Numbness and tingling in her hands the whole hands sometimes.  Improved from before  Was been on lisinopril for years stopped in June cough started afterwards now no cough    Review of Systems  all pertinent systems reviewed and are otherwise negative   Objective   /83 (BP Location: Left arm, Patient Position: Sitting, BP Cuff Size: Adult)   Pulse 81   Temp 36.2 °C (97.2 °F) (Temporal)   Ht 1.575 m (5' 2\")   Wt 67.3 kg (148 lb 5.9 oz)   SpO2 100%   BMI 27.14 kg/m²   Physical Exam  Constitutional:       General: She is not in acute distress.     Appearance: Normal appearance.   Eyes:      Extraocular Movements: Extraocular movements intact.      Pupils: Pupils are equal, round, and reactive to light.   Neck:      Comments: ~ 30 g rubbery  Cardiovascular:      Rate and Rhythm: Normal rate and regular rhythm.   Pulmonary:      Effort: Pulmonary effort is normal. No respiratory distress.      Breath sounds: Normal breath sounds.   Abdominal:      General: Bowel sounds are normal.      Palpations: Abdomen is soft.      Tenderness: There is no abdominal tenderness.   Skin:     Coloration: Skin is not jaundiced or pale.      Findings: No erythema or rash.   Neurological:      General: No focal deficit present.      Mental Status: She is alert and oriented to person, place, and time.      Deep Tendon Reflexes: Reflexes normal.   Psychiatric:         Mood and Affect: Mood normal.         Behavior: Behavior normal.       Lab Review  Glucose (mg/dL)   Date Value   03/04/2024 144 (H)   10/18/2023 233 (H)   12/31/2022 178 (H)   11/10/2022 134 (H)   06/17/2022 121 (H)     Hemoglobin A1C (%)   Date Value   03/04/2024 6.7 (H)   10/18/2023 10.3 (H)   06/03/2022 12.9 (A)   10/15/2021 9.4 (A)   04/07/2021 7.4     Bicarbonate (mmol/L)   Date Value   03/04/2024 28   10/18/2023 28   12/31/2022 27   11/10/2022 25   06/17/2022 27     Urea Nitrogen (mg/dL)   Date Value   03/04/2024 " "18   10/18/2023 20   12/31/2022 14   11/10/2022 12   06/17/2022 21     Creatinine (mg/dL)   Date Value   03/04/2024 0.70   10/18/2023 0.70   12/31/2022 0.73   11/10/2022 0.61   06/17/2022 0.95     Lab Results   Component Value Date    CHOL 200 (H) 10/18/2023    CHOL 160 11/19/2020    CHOL 225 (H) 09/20/2019     Lab Results   Component Value Date    HDL 87.3 10/18/2023    HDL 81.6 11/19/2020    HDL 85.4 09/20/2019     Lab Results   Component Value Date    LDLCALC 103 (H) 10/18/2023     Lab Results   Component Value Date    TRIG 50 10/18/2023    TRIG 60 11/19/2020    TRIG 44 09/20/2019     No components found for: \"CHOLHDL\"   Lab Results   Component Value Date    TSH 3.42 03/04/2024       Assessment/Plan   Mrs. Concepcion is a 48 year old lady with history of diabetes mellitus type 2 hyperlipidemia low vitamin D hypertension who was referred by Dr. Ok Walker for evaluation of goiter and abnormal TFTs coming for follow up.     Has been having a cough   Meds: Metformin, Jardiance, Pioglitazone and Ozempic    # Graves Disease  Was started on methimazole in July 2021. at that time TSH < 0.01 and FT4: 3.13 and T3: 237 TSI was 3.9  Was on methimazole 20 mg BID and metop 25 mg BID   Labs done in October showed a TSH < 0.01, FT4: 0.87 and FT4: 104.  Was on methimazole 10 mg 2 tabs in am and 1 in pm since October.  Blood tests TSH: 111 and FT4: < 0.25 on Friday May 3rd so we asked patient to hold methimazole  Methimazole was resumed in June patient did not follow-up until November blood work in November on methimazole 10 mg was TSH of 0.43 free T4 of 0.77 and T3 of 110 TSI was 2.4 we decrease methimazole to 5 mg repeated after 6 weeks TSH was 2.58 Free T4 0.85 and T3 123  November 7th stopped methimazole  Blood work in March showed a TSH: 3.42, FT4: 0.94 and T3: 93  TSI negative in October   Clinically euthyroid  Plan:  We will continue to monitor   Blood work in 6 months  We asked patient to let us know if any new symptoms "     # T2DM  Diabetic a1c: 6.7%   Lab Results   Component Value Date    HGBA1C 6.7 (H) 03/04/2024   We saw her in October and we started Jardiance and ozempic  on metformin 1000 mg BID and pioglitazone 45 mg  Lost 11lbs   In the morning < 100   During the day 120-130  No frequent yeast infection No hx of UTI No urinary symptoms  Hx of pancreatitis years and years ago (discussed with patient risk with GLP1RA)  Last ophthalmology in September 2023  Numbness and tingling in her hands the whole hands sometimes.  Improved from before  Was been on lisinopril for years stopped in June cough started afterwards now no cough  Plan:  Continue Metformin  Continue Jardiance  For now continue Ozempic 0.5 mg and in 1 month increase to 1 mg weekly  We asked her to stop pioglitazone when she increases the ozempic   Monitor BG 2 times a day.      Blood work before next apt  RTC in 6 months  Problem List Items Addressed This Visit       Graves disease    Relevant Orders    Renal Function Panel    Thyroid Stimulating Hormone    Thyroxine, Free    Triiodothyronine, Total    Thyroid Stimulating Immunoglobulin    Type 2 diabetes mellitus (CMS/HCC) - Primary    Relevant Medications    semaglutide (Ozempic) 1 mg/dose (4 mg/3 mL) pen injector    Other Relevant Orders    Renal Function Panel    Lipid Panel    Hemoglobin A1C    Albumin , Urine Random    Thyroid Stimulating Hormone    Thyroxine, Free    Triiodothyronine, Total    Thyroid Stimulating Immunoglobulin

## 2024-03-07 NOTE — PATIENT INSTRUCTIONS
For your thyroid  We will continue to monitor   Blood work in 6 months  In case you have new symptoms let us know we will recheck earlier    For your Diabetes  Continue Metformin  Continue Jardiance  For now continue Ozempic 0.5 mg and in 1 month increase to 1 mg weekly  When you increase stop pioglitazone   Monitor BG 2 times a day.  If you notice Blood sugars higher after stopping pioglitazone let me know    Blood work before next apt  RTC in 6 months

## 2024-04-29 DIAGNOSIS — E11.9 TYPE 2 DIABETES MELLITUS WITHOUT COMPLICATION, UNSPECIFIED WHETHER LONG TERM INSULIN USE (MULTI): Primary | ICD-10-CM

## 2024-05-02 RX ORDER — PIOGLITAZONEHYDROCHLORIDE 45 MG/1
45 TABLET ORAL DAILY
Qty: 90 TABLET | Refills: 1 | Status: SHIPPED | OUTPATIENT
Start: 2024-05-02

## 2024-06-27 ENCOUNTER — APPOINTMENT (OUTPATIENT)
Dept: ENDOCRINOLOGY | Facility: HOSPITAL | Age: 49
End: 2024-06-27
Payer: COMMERCIAL

## 2024-06-29 ENCOUNTER — LAB (OUTPATIENT)
Dept: LAB | Facility: LAB | Age: 49
End: 2024-06-29
Payer: COMMERCIAL

## 2024-07-02 ENCOUNTER — APPOINTMENT (OUTPATIENT)
Dept: ENDOCRINOLOGY | Facility: CLINIC | Age: 49
End: 2024-07-02
Payer: COMMERCIAL

## 2024-07-02 DIAGNOSIS — E05.00 GRAVES DISEASE: ICD-10-CM

## 2024-07-02 DIAGNOSIS — E11.9 TYPE 2 DIABETES MELLITUS WITHOUT COMPLICATION, WITHOUT LONG-TERM CURRENT USE OF INSULIN (MULTI): Primary | ICD-10-CM

## 2024-07-02 PROCEDURE — 99213 OFFICE O/P EST LOW 20 MIN: CPT | Performed by: STUDENT IN AN ORGANIZED HEALTH CARE EDUCATION/TRAINING PROGRAM

## 2024-07-02 PROCEDURE — 4010F ACE/ARB THERAPY RXD/TAKEN: CPT | Performed by: STUDENT IN AN ORGANIZED HEALTH CARE EDUCATION/TRAINING PROGRAM

## 2024-07-02 PROCEDURE — 3061F NEG MICROALBUMINURIA REV: CPT | Performed by: STUDENT IN AN ORGANIZED HEALTH CARE EDUCATION/TRAINING PROGRAM

## 2024-07-02 PROCEDURE — 3044F HG A1C LEVEL LT 7.0%: CPT | Performed by: STUDENT IN AN ORGANIZED HEALTH CARE EDUCATION/TRAINING PROGRAM

## 2024-07-02 NOTE — PROGRESS NOTES
Patient coming in for follow up for T2DM and hyperthyroidism     Subjective   Mrs. Concepcion is a 48 year old lady with history of diabetes mellitus type 2 hyperlipidemia low vitamin D hypertension and Graves disease coming for follow up.  Meds: Metformin, Jardiance, Pioglitazone and Ozempic     # Graves Disease  Was started on methimazole in 2021. at that time TSH < 0.01 and FT4: 3.13 and T3: 237 TSI was 3.9  Was on methimazole 20 mg BID and metop 25 mg BID   Labs done in October showed a TSH < 0.01, FT4: 0.87 and FT4: 104.  Was on methimazole 10 mg 2 tabs in am and 1 in pm since October.  Blood tests TSH: 111 and FT4: < 0.25 on Friday May 3rd so we asked patient to hold methimazole  Methimazole was resumed in  patient did not follow-up until November blood work in November on methimazole 10 mg was TSH of 0.43 free T4 of 0.77 and T3 of 110 TSI was 2.4 we decrease methimazole to 5 mg repeated after 6 weeks TSH was 2.58 Free T4 0.85 and T3 123   stopped methimazole  Blood work in March showed a TSH: 3.42, FT4: 0.94 and T3: 93  TSI negative in October   Energy: Good. Tired  Sleeping okay  Night sweats  Period irregular  Weight stable  BM: Once to twice a week in the past 2-3 weeks  No palpitations      # T2DM  Diabetic a1c: 6.7%         Lab Results   Component Value Date     HGBA1C 6.7 (H) 2024   We saw her in October and we started Jardiance and ozempic 1mg   on metformin 1000 mg BID   Weight stable per patient   Had a yeast infection a month ago  In the mornin  During the day 120  Hx of pancreatitis years and years ago (discussed with patient risk with GLP1RA)  Last ophthalmology in 2023  Numbness and tingling in her hands the whole hands sometimes.  Improved from before  Was been on lisinopril for years stopped in  cough started afterwards now no cough    Review of Systems  all pertinent systems reviewed and are otherwise negative   Objective   Not done  Physical  "Exam  Phone visit not done   Lab Review  Glucose (mg/dL)   Date Value   03/04/2024 144 (H)   10/18/2023 233 (H)   12/31/2022 178 (H)   11/10/2022 134 (H)   06/17/2022 121 (H)     Hemoglobin A1C (%)   Date Value   03/04/2024 6.7 (H)   10/18/2023 10.3 (H)   06/03/2022 12.9 (A)   10/15/2021 9.4 (A)   04/07/2021 7.4     Bicarbonate (mmol/L)   Date Value   03/04/2024 28   10/18/2023 28   12/31/2022 27   11/10/2022 25   06/17/2022 27     Urea Nitrogen (mg/dL)   Date Value   03/04/2024 18   10/18/2023 20   12/31/2022 14   11/10/2022 12   06/17/2022 21     Creatinine (mg/dL)   Date Value   03/04/2024 0.70   10/18/2023 0.70   12/31/2022 0.73   11/10/2022 0.61   06/17/2022 0.95     Lab Results   Component Value Date    CHOL 200 (H) 10/18/2023    CHOL 160 11/19/2020    CHOL 225 (H) 09/20/2019     Lab Results   Component Value Date    HDL 87.3 10/18/2023    HDL 81.6 11/19/2020    HDL 85.4 09/20/2019     Lab Results   Component Value Date    LDLCALC 103 (H) 10/18/2023     Lab Results   Component Value Date    TRIG 50 10/18/2023    TRIG 60 11/19/2020    TRIG 44 09/20/2019     No components found for: \"CHOLHDL\"   Lab Results   Component Value Date    TSH 3.42 03/04/2024       Assessment/Plan   Mrs. Concepcion is a 48 year old lady with history of diabetes mellitus type 2 hyperlipidemia low vitamin D hypertension and Graves disease coming for follow up.  Meds: Metformin, Jardiance, Pioglitazone and Ozempic     # Graves Disease  Was started on methimazole in July 2021. at that time TSH < 0.01 and FT4: 3.13 and T3: 237 TSI was 3.9  Was on methimazole 20 mg BID and metop 25 mg BID   Labs done in October showed a TSH < 0.01, FT4: 0.87 and FT4: 104.  Was on methimazole 10 mg 2 tabs in am and 1 in pm since October.  Blood tests TSH: 111 and FT4: < 0.25 on Friday May 3rd so we asked patient to hold methimazole  Methimazole was resumed in June patient did not follow-up until November blood work in November on methimazole 10 mg was TSH of 0.43 " free T4 of 0.77 and T3 of 110 TSI was 2.4 we decrease methimazole to 5 mg repeated after 6 weeks TSH was 2.58 Free T4 0.85 and T3 123   stopped methimazole  Blood work in March showed a TSH: 3.42, FT4: 0.94 and T3: 93  TSI negative in October     Clinically euthyroid  Blood work in September  We asked patient to let us know in case any hyperthyroid symptoms.     # T2DM  Diabetic a1c: 6.7%         Lab Results   Component Value Date     HGBA1C 6.7 (H) 2024   We saw her in October and we started Jardiance and ozempic 1mg   on metformin 1000 mg BID   Weight stable per patient   Had a yeast infection a month ago  In the mornin  During the day 120  Hx of pancreatitis years and years ago (discussed with patient risk with GLP1RA)  Last ophthalmology in 2023  Numbness and tingling in her hands the whole hands sometimes.  Improved from before  Was been on lisinopril for years stopped in  cough started afterwards now no cough    Plan  Continue Ozempic 1 mg weekly  Continue Jardiance 25  Continue metformin thousand twice a day  Continue to monitor blood sugar  We asked patient to let us know in case of any additional yeast infection then we will have to stop Jardiance  We discussed Ozempic symptoms and asked patient to let us know if any  Continue to follow with ophthalmology    Blood work in September  Return to clinic in 6-month  Problem List Items Addressed This Visit       Graves disease    Type 2 diabetes mellitus (Multi) - Primary

## 2024-07-12 ENCOUNTER — APPOINTMENT (OUTPATIENT)
Dept: ENDOCRINOLOGY | Facility: HOSPITAL | Age: 49
End: 2024-07-12
Payer: COMMERCIAL

## 2024-07-15 DIAGNOSIS — Z00.00 HEALTH CARE MAINTENANCE: Primary | ICD-10-CM

## 2024-07-18 RX ORDER — METFORMIN HYDROCHLORIDE 500 MG/1
1000 TABLET ORAL 2 TIMES DAILY
Qty: 180 TABLET | Refills: 0 | Status: SHIPPED | OUTPATIENT
Start: 2024-07-18

## 2024-07-24 ENCOUNTER — APPOINTMENT (OUTPATIENT)
Dept: ENDOCRINOLOGY | Facility: HOSPITAL | Age: 49
End: 2024-07-24
Payer: COMMERCIAL

## 2024-08-15 DIAGNOSIS — K85.90 ACUTE PANCREATITIS, UNSPECIFIED COMPLICATION STATUS, UNSPECIFIED PANCREATITIS TYPE (HHS-HCC): Primary | ICD-10-CM

## 2024-08-16 ENCOUNTER — LAB (OUTPATIENT)
Dept: LAB | Facility: LAB | Age: 49
End: 2024-08-16
Payer: COMMERCIAL

## 2024-08-16 DIAGNOSIS — K85.90 ACUTE PANCREATITIS, UNSPECIFIED COMPLICATION STATUS, UNSPECIFIED PANCREATITIS TYPE (HHS-HCC): ICD-10-CM

## 2024-08-16 LAB
ALBUMIN SERPL BCP-MCNC: 3.9 G/DL (ref 3.4–5)
ALP SERPL-CCNC: 103 U/L (ref 33–110)
ALT SERPL W P-5'-P-CCNC: 18 U/L (ref 7–45)
ANION GAP SERPL CALC-SCNC: 16 MMOL/L (ref 10–20)
AST SERPL W P-5'-P-CCNC: 20 U/L (ref 9–39)
BILIRUB SERPL-MCNC: 0.4 MG/DL (ref 0–1.2)
BUN SERPL-MCNC: 19 MG/DL (ref 6–23)
CALCIUM SERPL-MCNC: 9.7 MG/DL (ref 8.6–10.6)
CHLORIDE SERPL-SCNC: 101 MMOL/L (ref 98–107)
CO2 SERPL-SCNC: 26 MMOL/L (ref 21–32)
CREAT SERPL-MCNC: 0.44 MG/DL (ref 0.5–1.05)
EGFRCR SERPLBLD CKD-EPI 2021: >90 ML/MIN/1.73M*2
GLUCOSE SERPL-MCNC: 101 MG/DL (ref 74–99)
LIPASE SERPL-CCNC: 34 U/L (ref 9–82)
POTASSIUM SERPL-SCNC: 4.2 MMOL/L (ref 3.5–5.3)
PROT SERPL-MCNC: 6.6 G/DL (ref 6.4–8.2)
SODIUM SERPL-SCNC: 139 MMOL/L (ref 136–145)

## 2024-08-16 PROCEDURE — 83690 ASSAY OF LIPASE: CPT

## 2024-08-16 PROCEDURE — 80053 COMPREHEN METABOLIC PANEL: CPT

## 2024-08-16 PROCEDURE — 36415 COLL VENOUS BLD VENIPUNCTURE: CPT

## 2024-08-19 ENCOUNTER — APPOINTMENT (OUTPATIENT)
Dept: PRIMARY CARE | Facility: CLINIC | Age: 49
End: 2024-08-19
Payer: COMMERCIAL

## 2024-08-27 ENCOUNTER — LAB (OUTPATIENT)
Dept: LAB | Facility: LAB | Age: 49
End: 2024-08-27
Payer: COMMERCIAL

## 2024-08-27 ENCOUNTER — OFFICE VISIT (OUTPATIENT)
Dept: PRIMARY CARE | Facility: CLINIC | Age: 49
End: 2024-08-27
Payer: COMMERCIAL

## 2024-08-27 VITALS
HEART RATE: 100 BPM | WEIGHT: 134 LBS | SYSTOLIC BLOOD PRESSURE: 146 MMHG | RESPIRATION RATE: 12 BRPM | BODY MASS INDEX: 24.51 KG/M2 | DIASTOLIC BLOOD PRESSURE: 83 MMHG

## 2024-08-27 DIAGNOSIS — I10 PRIMARY HYPERTENSION: ICD-10-CM

## 2024-08-27 DIAGNOSIS — R63.4 WEIGHT LOSS: ICD-10-CM

## 2024-08-27 DIAGNOSIS — E05.00 GRAVES DISEASE: Primary | ICD-10-CM

## 2024-08-27 DIAGNOSIS — E05.00 GRAVES DISEASE: ICD-10-CM

## 2024-08-27 DIAGNOSIS — R00.0 SINUS TACHYCARDIA: ICD-10-CM

## 2024-08-27 PROBLEM — H54.40 BLINDNESS OF LEFT EYE: Status: RESOLVED | Noted: 2024-03-07 | Resolved: 2024-08-27

## 2024-08-27 PROBLEM — H33.22: Status: ACTIVE | Noted: 2024-03-07

## 2024-08-27 LAB
T4 FREE SERPL-MCNC: >7 NG/DL (ref 0.78–1.48)
TSH SERPL-ACNC: 0.02 MIU/L (ref 0.44–3.98)

## 2024-08-27 PROCEDURE — 4010F ACE/ARB THERAPY RXD/TAKEN: CPT | Performed by: INTERNAL MEDICINE

## 2024-08-27 PROCEDURE — 3044F HG A1C LEVEL LT 7.0%: CPT | Performed by: INTERNAL MEDICINE

## 2024-08-27 PROCEDURE — 3079F DIAST BP 80-89 MM HG: CPT | Performed by: INTERNAL MEDICINE

## 2024-08-27 PROCEDURE — 99214 OFFICE O/P EST MOD 30 MIN: CPT | Performed by: INTERNAL MEDICINE

## 2024-08-27 PROCEDURE — 3077F SYST BP >= 140 MM HG: CPT | Performed by: INTERNAL MEDICINE

## 2024-08-27 PROCEDURE — 36415 COLL VENOUS BLD VENIPUNCTURE: CPT

## 2024-08-27 PROCEDURE — 3061F NEG MICROALBUMINURIA REV: CPT | Performed by: INTERNAL MEDICINE

## 2024-08-27 PROCEDURE — 84443 ASSAY THYROID STIM HORMONE: CPT

## 2024-08-27 PROCEDURE — 84439 ASSAY OF FREE THYROXINE: CPT

## 2024-08-27 NOTE — PROGRESS NOTES
Subjective   Patient ID: Pebbles Concepcion is a 49 y.o. female who presents for No chief complaint on file..    HPI   Follow-up visit her endocrinologist is on leave had some abdominal discomfort some neck discomfort noticed that her heart rate was elevated she was concerned about the Graves' disease she has been on Ozempic Jardiance and metformin but had lost weight in addition and her symptoms she thought were more like when she had thyroid disease she is taking no additional medicine for either weight loss or vitamins or iodine supplement no chest pain no shortness of breath some palpitation no diarrhea       vital signs noted alert and oriented x 3 NCAT  Review of Systems    Objective   /83   Pulse 100   Resp 12   Wt 60.8 kg (134 lb)   BMI 24.51 kg/m²     Physical Exam no JVD no lid lag no proptosis moderate thyromegaly extremities no clubbing cyanosis or edema normal distal pulses DTR 2+ and brisk some mild tremor chest clear to auscultation CV regular rate and rhythm S1-S2 without murmur gallop or rub except for the heart rate being around 100 abdomen soft nontender normal active bowel sounds    Assessment/Plan impression weight loss Graves' disease elevated blood pressure tachycardia other diagnoses  Plan check TSH and T4 and advised on methimazole or metoprolol both of which she has at home for the thyroid and the bp/ tachycardia (watch caffeiene, salt, sudafed, nicotine) from prior then follow-up with endocrinology based on above her previous blood work was reviewed (check) there was no evidence of chemical pancreatitis stomach has been ok ppi as needed good nutrition d/w patient family then we will recheck in 1 day  tt40 cc21

## 2024-08-28 ENCOUNTER — APPOINTMENT (OUTPATIENT)
Dept: OPHTHALMOLOGY | Facility: CLINIC | Age: 49
End: 2024-08-28
Payer: COMMERCIAL

## 2024-08-28 DIAGNOSIS — Z00.00 HEALTH CARE MAINTENANCE: Primary | ICD-10-CM

## 2024-08-29 RX ORDER — METHIMAZOLE 5 MG/1
5 TABLET ORAL 2 TIMES DAILY
Qty: 60 TABLET | Refills: 1 | Status: SHIPPED | OUTPATIENT
Start: 2024-08-29

## 2024-09-04 DIAGNOSIS — E05.00 GRAVES DISEASE: Primary | ICD-10-CM

## 2024-09-04 RX ORDER — METHIMAZOLE 10 MG/1
10 TABLET ORAL 2 TIMES DAILY
Qty: 60 TABLET | Refills: 11 | Status: SHIPPED | OUTPATIENT
Start: 2024-09-04 | End: 2025-09-04

## 2024-09-05 DIAGNOSIS — Z00.00 HEALTH CARE MAINTENANCE: ICD-10-CM

## 2024-09-05 RX ORDER — METFORMIN HYDROCHLORIDE 500 MG/1
1000 TABLET ORAL 2 TIMES DAILY
Qty: 180 TABLET | Refills: 0 | Status: SHIPPED | OUTPATIENT
Start: 2024-09-05

## 2024-09-11 ENCOUNTER — TELEPHONE (OUTPATIENT)
Dept: PRIMARY CARE | Facility: CLINIC | Age: 49
End: 2024-09-11

## 2024-09-12 DIAGNOSIS — M54.2 CERVICALGIA: Primary | ICD-10-CM

## 2024-09-12 NOTE — TELEPHONE ENCOUNTER
Patient is still having the pain in my neck/ upper shoulder. She sent you a [employer] message yesterday and called and said it's pretty severe. Asked if you could pleas order an X-ray?

## 2024-09-16 ENCOUNTER — OFFICE VISIT (OUTPATIENT)
Dept: OBSTETRICS AND GYNECOLOGY | Facility: CLINIC | Age: 49
End: 2024-09-16
Payer: COMMERCIAL

## 2024-09-16 VITALS
WEIGHT: 132 LBS | DIASTOLIC BLOOD PRESSURE: 64 MMHG | HEIGHT: 62 IN | BODY MASS INDEX: 24.29 KG/M2 | SYSTOLIC BLOOD PRESSURE: 136 MMHG

## 2024-09-16 DIAGNOSIS — N92.6 IRREGULAR MENSES: ICD-10-CM

## 2024-09-16 DIAGNOSIS — Z01.419 ENCOUNTER FOR ANNUAL ROUTINE GYNECOLOGICAL EXAMINATION: Primary | ICD-10-CM

## 2024-09-16 DIAGNOSIS — N95.1 MENOPAUSAL SYMPTOMS: ICD-10-CM

## 2024-09-16 PROCEDURE — 4010F ACE/ARB THERAPY RXD/TAKEN: CPT | Performed by: OBSTETRICS & GYNECOLOGY

## 2024-09-16 PROCEDURE — 3044F HG A1C LEVEL LT 7.0%: CPT | Performed by: OBSTETRICS & GYNECOLOGY

## 2024-09-16 PROCEDURE — 3061F NEG MICROALBUMINURIA REV: CPT | Performed by: OBSTETRICS & GYNECOLOGY

## 2024-09-16 PROCEDURE — 3078F DIAST BP <80 MM HG: CPT | Performed by: OBSTETRICS & GYNECOLOGY

## 2024-09-16 PROCEDURE — 3008F BODY MASS INDEX DOCD: CPT | Performed by: OBSTETRICS & GYNECOLOGY

## 2024-09-16 PROCEDURE — 3075F SYST BP GE 130 - 139MM HG: CPT | Performed by: OBSTETRICS & GYNECOLOGY

## 2024-09-16 PROCEDURE — 99386 PREV VISIT NEW AGE 40-64: CPT | Performed by: OBSTETRICS & GYNECOLOGY

## 2024-09-16 ASSESSMENT — ENCOUNTER SYMPTOMS
NEUROLOGICAL NEGATIVE: 0
PSYCHIATRIC NEGATIVE: 0
ENDOCRINE NEGATIVE: 0
ALLERGIC/IMMUNOLOGIC NEGATIVE: 0
LOSS OF SENSATION IN FEET: 0
HEMATOLOGIC/LYMPHATIC NEGATIVE: 0
MUSCULOSKELETAL NEGATIVE: 0
CONSTITUTIONAL NEGATIVE: 0
DEPRESSION: 0
GASTROINTESTINAL NEGATIVE: 0
OCCASIONAL FEELINGS OF UNSTEADINESS: 0
EYES NEGATIVE: 0
CARDIOVASCULAR NEGATIVE: 0
RESPIRATORY NEGATIVE: 0

## 2024-09-16 ASSESSMENT — PAIN SCALES - GENERAL: PAINLEVEL: 0-NO PAIN

## 2024-09-16 NOTE — PROGRESS NOTES
Subjective   Patient ID: Pebbles Concepcion is a 49 y.o. female who presents for New Patient Visit (New patient visit/menopause last pap 12/10/15 wnl HPV negative no mammogram on file).  HPI  Patient is a 49-year-old female  1 para 1 with a previous  section in   Here for an annual exam and to reestablish care.  She also complaining of increasing hot flashes.  And currently is being treated for hyperthyroidism.    She denies any urinary or bowel symptoms.    She is due for a mammogram and Pap test  Review of Systems   All other systems reviewed and are negative.  Hot flashes  Night sweats  Vaginal dryness    Objective   Physical Exam  Thyroid: No thyroid megaly    Cardiovascular: Regular rate and rhythm    Lungs: Clear to auscultation    Breasts: No skin changes no masses palpated    Abdomen: Soft nontender bowel sounds positive no masses palpated    Extremities nontender no edema    Pelvic exam: External genitalia [place]'s urethra and [place]'s are normal.  Vaginal exam shows no lesions or discharge.  Pelvic bimanual exam reveals no masses or tenderness.  Vaginal atrophy noted  Assessment/Plan   Patient with menopausal symptoms hot flashes and night sweats.  This is modeled by the fact that she is hyper thyroid.  Pap smear performed and mammogram ordered      FSH and prolactin levels ordered.  Will call with these reports and consider estrogen therapy for the vagina.  Would observe hot flashes and see if they improve with improvement of her hypothyroidism.  May consider systemic estrogen therapy.         Gunnar Jarrett MD 24 1:53 PM

## 2024-09-23 ENCOUNTER — LAB (OUTPATIENT)
Dept: LAB | Facility: LAB | Age: 49
End: 2024-09-23
Payer: COMMERCIAL

## 2024-09-23 DIAGNOSIS — N95.1 MENOPAUSAL SYMPTOMS: Primary | ICD-10-CM

## 2024-09-23 DIAGNOSIS — N92.6 IRREGULAR MENSES: ICD-10-CM

## 2024-09-23 LAB
FSH SERPL-ACNC: 92.4 IU/L
LH SERPL-ACNC: 72.8 IU/L
PROLACTIN SERPL-MCNC: 13.3 UG/L (ref 3–20)

## 2024-09-23 PROCEDURE — 83001 ASSAY OF GONADOTROPIN (FSH): CPT

## 2024-09-23 PROCEDURE — 83002 ASSAY OF GONADOTROPIN (LH): CPT

## 2024-09-23 PROCEDURE — 84146 ASSAY OF PROLACTIN: CPT

## 2024-09-23 PROCEDURE — 36415 COLL VENOUS BLD VENIPUNCTURE: CPT

## 2024-09-24 ENCOUNTER — APPOINTMENT (OUTPATIENT)
Dept: OPHTHALMOLOGY | Facility: CLINIC | Age: 49
End: 2024-09-24
Payer: COMMERCIAL

## 2024-09-25 LAB
CYTOLOGY CMNT CVX/VAG CYTO-IMP: NORMAL
HPV HR 12 DNA GENITAL QL NAA+PROBE: NEGATIVE
HPV HR GENOTYPES PNL CVX NAA+PROBE: NEGATIVE
HPV16 DNA SPEC QL NAA+PROBE: NEGATIVE
HPV18 DNA SPEC QL NAA+PROBE: NEGATIVE
LAB AP HPV GENOTYPE QUESTION: YES
LAB AP HPV HR: NORMAL
LABORATORY COMMENT REPORT: NORMAL
PATH REPORT.TOTAL CANCER: NORMAL

## 2024-09-25 RX ORDER — ESTRADIOL 1 MG/1
1 TABLET ORAL DAILY
Qty: 90 TABLET | Refills: 0 | Status: SHIPPED | OUTPATIENT
Start: 2024-09-25 | End: 2025-09-25

## 2024-09-26 ENCOUNTER — LAB (OUTPATIENT)
Dept: LAB | Facility: LAB | Age: 49
End: 2024-09-26
Payer: COMMERCIAL

## 2024-09-26 ENCOUNTER — APPOINTMENT (OUTPATIENT)
Dept: ENDOCRINOLOGY | Facility: CLINIC | Age: 49
End: 2024-09-26
Payer: COMMERCIAL

## 2024-09-26 DIAGNOSIS — E11.9 TYPE 2 DIABETES MELLITUS WITHOUT COMPLICATION, WITHOUT LONG-TERM CURRENT USE OF INSULIN (MULTI): Primary | ICD-10-CM

## 2024-09-26 DIAGNOSIS — Z00.00 HEALTH CARE MAINTENANCE: ICD-10-CM

## 2024-09-26 DIAGNOSIS — E05.00 GRAVES DISEASE: ICD-10-CM

## 2024-09-26 DIAGNOSIS — E11.9 TYPE 2 DIABETES MELLITUS WITHOUT COMPLICATION, WITHOUT LONG-TERM CURRENT USE OF INSULIN (MULTI): ICD-10-CM

## 2024-09-26 LAB
ALBUMIN SERPL BCP-MCNC: 4 G/DL (ref 3.4–5)
ALP SERPL-CCNC: 100 U/L (ref 33–110)
ALT SERPL W P-5'-P-CCNC: 21 U/L (ref 7–45)
ANION GAP SERPL CALC-SCNC: 16 MMOL/L (ref 10–20)
AST SERPL W P-5'-P-CCNC: 20 U/L (ref 9–39)
BILIRUB SERPL-MCNC: 0.3 MG/DL (ref 0–1.2)
BUN SERPL-MCNC: 24 MG/DL (ref 6–23)
CALCIUM SERPL-MCNC: 9.4 MG/DL (ref 8.6–10.3)
CHLORIDE SERPL-SCNC: 101 MMOL/L (ref 98–107)
CHOLEST SERPL-MCNC: 207 MG/DL (ref 0–199)
CHOLESTEROL/HDL RATIO: 2.1
CO2 SERPL-SCNC: 23 MMOL/L (ref 21–32)
CREAT SERPL-MCNC: 0.97 MG/DL (ref 0.5–1.05)
CREAT UR-MCNC: 59.9 MG/DL (ref 20–320)
EGFRCR SERPLBLD CKD-EPI 2021: 71 ML/MIN/1.73M*2
EST. AVERAGE GLUCOSE BLD GHB EST-MCNC: 154 MG/DL
GLUCOSE SERPL-MCNC: 172 MG/DL (ref 74–99)
HBA1C MFR BLD: 7 %
HDLC SERPL-MCNC: 96.6 MG/DL
LDLC SERPL CALC-MCNC: 95 MG/DL
MICROALBUMIN UR-MCNC: 11.8 MG/L
MICROALBUMIN/CREAT UR: 19.7 UG/MG CREAT
NON HDL CHOLESTEROL: 110 MG/DL (ref 0–149)
PHOSPHATE SERPL-MCNC: 5.3 MG/DL (ref 2.5–4.9)
POTASSIUM SERPL-SCNC: 4.4 MMOL/L (ref 3.5–5.3)
PROT SERPL-MCNC: 6.6 G/DL (ref 6.4–8.2)
SODIUM SERPL-SCNC: 136 MMOL/L (ref 136–145)
T3 SERPL-MCNC: 218 NG/DL (ref 60–200)
T4 FREE SERPL-MCNC: 1.52 NG/DL (ref 0.61–1.12)
TRIGL SERPL-MCNC: 78 MG/DL (ref 0–149)
TSH SERPL-ACNC: <0.01 MIU/L (ref 0.44–3.98)
VLDL: 16 MG/DL (ref 0–40)

## 2024-09-26 PROCEDURE — 84443 ASSAY THYROID STIM HORMONE: CPT

## 2024-09-26 PROCEDURE — 36415 COLL VENOUS BLD VENIPUNCTURE: CPT

## 2024-09-26 PROCEDURE — 3048F LDL-C <100 MG/DL: CPT | Performed by: STUDENT IN AN ORGANIZED HEALTH CARE EDUCATION/TRAINING PROGRAM

## 2024-09-26 PROCEDURE — 80061 LIPID PANEL: CPT

## 2024-09-26 PROCEDURE — 4010F ACE/ARB THERAPY RXD/TAKEN: CPT | Performed by: STUDENT IN AN ORGANIZED HEALTH CARE EDUCATION/TRAINING PROGRAM

## 2024-09-26 PROCEDURE — 84100 ASSAY OF PHOSPHORUS: CPT

## 2024-09-26 PROCEDURE — 3061F NEG MICROALBUMINURIA REV: CPT | Performed by: STUDENT IN AN ORGANIZED HEALTH CARE EDUCATION/TRAINING PROGRAM

## 2024-09-26 PROCEDURE — 80053 COMPREHEN METABOLIC PANEL: CPT

## 2024-09-26 PROCEDURE — 83036 HEMOGLOBIN GLYCOSYLATED A1C: CPT

## 2024-09-26 PROCEDURE — 82570 ASSAY OF URINE CREATININE: CPT

## 2024-09-26 PROCEDURE — 82043 UR ALBUMIN QUANTITATIVE: CPT

## 2024-09-26 PROCEDURE — 3051F HG A1C>EQUAL 7.0%<8.0%: CPT | Performed by: STUDENT IN AN ORGANIZED HEALTH CARE EDUCATION/TRAINING PROGRAM

## 2024-09-26 PROCEDURE — 84480 ASSAY TRIIODOTHYRONINE (T3): CPT

## 2024-09-26 PROCEDURE — 99213 OFFICE O/P EST LOW 20 MIN: CPT | Performed by: STUDENT IN AN ORGANIZED HEALTH CARE EDUCATION/TRAINING PROGRAM

## 2024-09-26 PROCEDURE — 84445 ASSAY OF TSI GLOBULIN: CPT

## 2024-09-26 PROCEDURE — 84439 ASSAY OF FREE THYROXINE: CPT

## 2024-09-26 RX ORDER — METFORMIN HYDROCHLORIDE 500 MG/1
1000 TABLET ORAL 2 TIMES DAILY
Qty: 180 TABLET | Refills: 0 | Status: SHIPPED | OUTPATIENT
Start: 2024-09-26

## 2024-09-26 NOTE — PROGRESS NOTES
Patient coming in for follow up for T2DM    Subjective   Pbebles Concepcion is a 49 y.o. female who presents for follow up for Type 2 diabetes mellitus.   Lab Results   Component Value Date    HGBA1C 6.7 (H) 03/04/2024      Mrs. Concepcion is a 49 year old lady with history of diabetes mellitus type 2 hyperlipidemia low vitamin D hypertension and Graves disease coming for follow up.  Meds: Metformin, Jardiance, Pioglitazone and Ozempic     # Graves Disease  Was started on methimazole in July 2021. at that time TSH < 0.01 and FT4: 3.13 and T3: 237 TSI was 3.9  Was on methimazole 20 mg BID and metop 25 mg BID   Labs done in October showed a TSH < 0.01, FT4: 0.87 and FT4: 104.  Was on methimazole 10 mg 2 tabs in am and 1 in pm since October.  Blood tests TSH: 111 and FT4: < 0.25 on Friday May 3rd so we asked patient to hold methimazole  Methimazole was resumed in June patient did not follow-up until November blood work in November on methimazole 10 mg was TSH of 0.43 free T4 of 0.77 and T3 of 110 TSI was 2.4 we decrease methimazole to 5 mg repeated after 6 weeks TSH was 2.58 Free T4 0.85 and T3 123  November 7th stopped methimazole  Blood work in March showed a TSH: 3.42, FT4: 0.94 and T3: 93  TSI negative in October      Feels much better on methimazole 10 mg BID  Currently having isuses with menopause     # T2DM  Diabetic a1c: 6.7%             Lab Results   Component Value Date     HGBA1C 6.7 (H) 03/04/2024   We saw her in October and we started Jardiance and ozempic 1mg   on metformin 1000 mg BID   Weight stable per patient   Had a yeast infection a month ago  Hx of pancreatitis years and years ago (discussed with patient risk with GLP1RA)  Last ophthalmology in September 2023  Numbness and tingling in her hands the whole hands sometimes.  Improved from before  Was been on lisinopril for years stopped in June cough started afterwards now no cough   Stopped ozempic since lost weight  Felt gained weight  On ozempic 90  After  "meals low 100  With pioglitazone 45 mg BG in 200 mg  Review of Systems  all pertinent systems reviewed and are otherwise negative   Objective   Not done since virtual  Physical Exam  In no acute distress  Not done since virtual  Lab Review  Glucose (mg/dL)   Date Value   08/16/2024 101 (H)   03/04/2024 144 (H)   10/18/2023 233 (H)     Hemoglobin A1C (%)   Date Value   03/04/2024 6.7 (H)   10/18/2023 10.3 (H)   06/03/2022 12.9 (A)   10/15/2021 9.4 (A)   04/07/2021 7.4     Bicarbonate (mmol/L)   Date Value   08/16/2024 26   03/04/2024 28   10/18/2023 28     Urea Nitrogen (mg/dL)   Date Value   08/16/2024 19   03/04/2024 18   10/18/2023 20     Creatinine (mg/dL)   Date Value   08/16/2024 0.44 (L)   03/04/2024 0.70   10/18/2023 0.70     Lab Results   Component Value Date    CHOL 200 (H) 10/18/2023    CHOL 160 11/19/2020    CHOL 225 (H) 09/20/2019     Lab Results   Component Value Date    HDL 87.3 10/18/2023    HDL 81.6 11/19/2020    HDL 85.4 09/20/2019     Lab Results   Component Value Date    LDLCALC 103 (H) 10/18/2023     Lab Results   Component Value Date    TRIG 50 10/18/2023    TRIG 60 11/19/2020    TRIG 44 09/20/2019     No components found for: \"CHOLHDL\"   Lab Results   Component Value Date    TSH 0.02 (L) 08/27/2024     No results found for: \"ALBUR\", \"[id number]\"     Health Maintenance:       Assessment/Plan   Mrs. Concepcion is a 49 year old lady with history of diabetes mellitus type 2 hyperlipidemia low vitamin D hypertension and Graves disease coming for follow up.  Meds: Metformin, Jardiance, Pioglitazone and Ozempic     # Graves Disease  Was started on methimazole in July 2021. at that time TSH < 0.01 and FT4: 3.13 and T3: 237 TSI was 3.9  Was on methimazole 20 mg BID and metop 25 mg BID   Labs done in October showed a TSH < 0.01, FT4: 0.87 and FT4: 104.  Was on methimazole 10 mg 2 tabs in am and 1 in pm since October.  Blood tests TSH: 111 and FT4: < 0.25 on Friday May 3rd so we asked patient to hold " methimazole  Methimazole was resumed in June patient did not follow-up until November blood work in November on methimazole 10 mg was TSH of 0.43 free T4 of 0.77 and T3 of 110 TSI was 2.4 we decrease methimazole to 5 mg repeated after 6 weeks TSH was 2.58 Free T4 0.85 and T3 123  November 7th stopped methimazole  Blood work in March showed a TSH: 3.42, FT4: 0.94 and T3: 93  TSI negative in October      Feels much better on methimazole 10 mg BID  Currently having isuses with menopause     # T2DM  Diabetic a1c: 6.7%             Lab Results   Component Value Date     HGBA1C 6.7 (H) 03/04/2024   We saw her in October and we started Jardiance and ozempic 1mg   on metformin 1000 mg BID   Weight stable per patient   Had a yeast infection a month ago  Hx of pancreatitis years and years ago (discussed with patient risk with GLP1RA)  Last ophthalmology in September 2023  Numbness and tingling in her hands the whole hands sometimes.  Improved from before  Was been on lisinopril for years stopped in June cough started afterwards now no cough   Stopped ozempic since lost weight  Felt gained weight  On ozempic 90  After meals low 100  With pioglitazone 45 mg BG in 200 mg  Plan    Resume ozempic 0.25 mg for 2 weeks  0.5 mg for 2 weeks then 1 mg  Continue pioglitazone  Continue methimazole  Labs  RTC in 3 months  Assessment & Plan  Type 2 diabetes mellitus without complication, without long-term current use of insulin (Multi)         Graves disease

## 2024-09-29 DIAGNOSIS — E05.00 GRAVES DISEASE: Primary | ICD-10-CM

## 2024-10-01 ENCOUNTER — APPOINTMENT (OUTPATIENT)
Dept: OPHTHALMOLOGY | Facility: CLINIC | Age: 49
End: 2024-10-01
Payer: COMMERCIAL

## 2024-10-01 DIAGNOSIS — H52.7 REFRACTIVE ERROR: ICD-10-CM

## 2024-10-01 DIAGNOSIS — H33.22: Primary | ICD-10-CM

## 2024-10-01 PROCEDURE — 99213 OFFICE O/P EST LOW 20 MIN: CPT | Performed by: OPHTHALMOLOGY

## 2024-10-01 PROCEDURE — 92134 CPTRZ OPH DX IMG PST SGM RTA: CPT | Performed by: OPHTHALMOLOGY

## 2024-10-01 PROCEDURE — 92015 DETERMINE REFRACTIVE STATE: CPT | Performed by: OPHTHALMOLOGY

## 2024-10-01 ASSESSMENT — VISUAL ACUITY
METHOD: SNELLEN - LINEAR
OD_CC: 20/40
METHOD: SNELLEN - LINEAR
OD_CC+: -1

## 2024-10-01 ASSESSMENT — EXTERNAL EXAM - RIGHT EYE: OD_EXAM: NORMAL

## 2024-10-01 ASSESSMENT — REFRACTION_MANIFEST
OD_SPHERE: -4.00
OD_ADD: +1.75
OS_SPHERE: BALANCE
OS_ADD: +1.75
OD_AXIS: 030
OD_CYLINDER: +0.75

## 2024-10-01 ASSESSMENT — EXTERNAL EXAM - LEFT EYE: OS_EXAM: NORMAL

## 2024-10-01 ASSESSMENT — ENCOUNTER SYMPTOMS
ENDOCRINE NEGATIVE: 0
CARDIOVASCULAR NEGATIVE: 0
CONSTITUTIONAL NEGATIVE: 0
NEUROLOGICAL NEGATIVE: 0
GASTROINTESTINAL NEGATIVE: 0
HEMATOLOGIC/LYMPHATIC NEGATIVE: 0
RESPIRATORY NEGATIVE: 0
PSYCHIATRIC NEGATIVE: 0
MUSCULOSKELETAL NEGATIVE: 0
ALLERGIC/IMMUNOLOGIC NEGATIVE: 0
EYES NEGATIVE: 1

## 2024-10-01 ASSESSMENT — CONF VISUAL FIELD
OD_SUPERIOR_NASAL_RESTRICTION: 0
OS_SUPERIOR_NASAL_RESTRICTION: 2
OS_INFERIOR_TEMPORAL_RESTRICTION: 2
OD_SUPERIOR_TEMPORAL_RESTRICTION: 0
OS_INFERIOR_NASAL_RESTRICTION: 2
OS_SUPERIOR_TEMPORAL_RESTRICTION: 2
OD_INFERIOR_TEMPORAL_RESTRICTION: 0
OD_INFERIOR_NASAL_RESTRICTION: 0
OD_NORMAL: 1

## 2024-10-01 ASSESSMENT — TONOMETRY
OD_IOP_MMHG: 18
IOP_METHOD: TONOPEN
OS_IOP_MMHG: 16

## 2024-10-01 ASSESSMENT — CUP TO DISC RATIO
OS_RATIO: .3
OD_RATIO: .3

## 2024-10-01 ASSESSMENT — SLIT LAMP EXAM - LIDS
COMMENTS: GOOD POSITION
COMMENTS: GOOD POSITION

## 2024-10-01 NOTE — PROGRESS NOTES
10/1/2024 CC: 49 y.o. presents for comprehensive eval.    Past ocular history: glasses, PDR, Vitrectomy OD  Family history: no family history of glaucoma   Past medical history: DM  Social history: denies tobacco     Eye medications:   Both eyes: none    Allergy:  NKDA    Testing:    OCTm 9/21/2023: OD- Regular macular contour, ERM, . OS- diffuse thin.    OCTm 10/1/2024: OD- Regular macular contour, ERM, . OS- diffuse thin, RD.    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 Mrx, monocular precautions.    Eye medications:   Both eyes: none    RTC w/ retina, DFE

## 2024-10-09 ENCOUNTER — HOSPITAL ENCOUNTER (OUTPATIENT)
Dept: RADIOLOGY | Facility: CLINIC | Age: 49
Discharge: HOME | End: 2024-10-09
Payer: COMMERCIAL

## 2024-10-09 VITALS — WEIGHT: 132.06 LBS | HEIGHT: 62 IN | BODY MASS INDEX: 24.3 KG/M2

## 2024-10-09 DIAGNOSIS — Z01.419 ENCOUNTER FOR ANNUAL ROUTINE GYNECOLOGICAL EXAMINATION: ICD-10-CM

## 2024-10-09 PROCEDURE — 77063 BREAST TOMOSYNTHESIS BI: CPT

## 2024-10-14 LAB — TSI SER-ACNC: 3.5 TSI INDEX

## 2024-10-25 ENCOUNTER — APPOINTMENT (OUTPATIENT)
Dept: OPHTHALMOLOGY | Facility: CLINIC | Age: 49
End: 2024-10-25
Payer: COMMERCIAL

## 2024-11-06 DIAGNOSIS — E11.9 TYPE 2 DIABETES MELLITUS WITHOUT COMPLICATION, WITH LONG-TERM CURRENT USE OF INSULIN (MULTI): ICD-10-CM

## 2024-11-06 DIAGNOSIS — Z79.4 TYPE 2 DIABETES MELLITUS WITHOUT COMPLICATION, WITH LONG-TERM CURRENT USE OF INSULIN (MULTI): ICD-10-CM

## 2024-11-06 RX ORDER — EMPAGLIFLOZIN 10 MG/1
10 TABLET, FILM COATED ORAL DAILY
Qty: 90 TABLET | Refills: 3 | Status: SHIPPED | OUTPATIENT
Start: 2024-11-06 | End: 2024-11-07 | Stop reason: DRUGHIGH

## 2024-11-07 DIAGNOSIS — E11.9 TYPE 2 DIABETES MELLITUS WITHOUT COMPLICATION, WITHOUT LONG-TERM CURRENT USE OF INSULIN (MULTI): ICD-10-CM

## 2024-11-11 DIAGNOSIS — E11.9 TYPE 2 DIABETES MELLITUS WITHOUT COMPLICATION, WITHOUT LONG-TERM CURRENT USE OF INSULIN (MULTI): ICD-10-CM

## 2024-11-11 RX ORDER — SEMAGLUTIDE 1.34 MG/ML
1 INJECTION, SOLUTION SUBCUTANEOUS
Qty: 3 ML | Refills: 3 | Status: SHIPPED | OUTPATIENT
Start: 2024-11-11

## 2024-11-12 ENCOUNTER — APPOINTMENT (OUTPATIENT)
Dept: PRIMARY CARE | Facility: CLINIC | Age: 49
End: 2024-11-12
Payer: COMMERCIAL

## 2024-11-12 VITALS — SYSTOLIC BLOOD PRESSURE: 123 MMHG | DIASTOLIC BLOOD PRESSURE: 73 MMHG

## 2024-11-12 DIAGNOSIS — M62.89 MUSCLE TIGHTNESS: ICD-10-CM

## 2024-11-12 DIAGNOSIS — M54.2 CERVICALGIA OF OCCIPITO-ATLANTO-AXIAL REGION: Primary | ICD-10-CM

## 2024-11-12 DIAGNOSIS — E04.0 DIFFUSE GOITER: ICD-10-CM

## 2024-11-12 PROCEDURE — 3051F HG A1C>EQUAL 7.0%<8.0%: CPT | Performed by: INTERNAL MEDICINE

## 2024-11-12 PROCEDURE — 99213 OFFICE O/P EST LOW 20 MIN: CPT | Performed by: INTERNAL MEDICINE

## 2024-11-12 PROCEDURE — 3048F LDL-C <100 MG/DL: CPT | Performed by: INTERNAL MEDICINE

## 2024-11-12 PROCEDURE — 3061F NEG MICROALBUMINURIA REV: CPT | Performed by: INTERNAL MEDICINE

## 2024-11-12 PROCEDURE — 3078F DIAST BP <80 MM HG: CPT | Performed by: INTERNAL MEDICINE

## 2024-11-12 PROCEDURE — 3074F SYST BP LT 130 MM HG: CPT | Performed by: INTERNAL MEDICINE

## 2024-11-12 PROCEDURE — 4010F ACE/ARB THERAPY RXD/TAKEN: CPT | Performed by: INTERNAL MEDICINE

## 2024-11-12 NOTE — PROGRESS NOTES
Subjective   Patient ID: Pebbles Concepcion is a 49 y.o. female who presents for No chief complaint on file..    HPI follow-up visit and sick visit no chest pain no shortness of breath no side effect with medication but has had some left-sided neck pain and headache no trauma Works at [employer] her head down most of the time especially when she is working from home some tingling towards the left hand she thought carpal tunnel    Review of Systems    Objective   LMP  (LMP Unknown)     Physical Exam vital signs noted alert and oriented x 3 NCAT no new JVD or bruit cervical spine full range of motion some tightness and thickness to the left posterior paraspinal muscle normal upper extremity handgrip and DTR 2+ chest clear to auscultation CV regular rate and rhythm S1-S2 extremities no clubbing cyanosis or edema normal distal pulses    Assessment/Plan impression cervical spine muscle tightness headache (goiter)   plan follow-up with the previously placed cervical spine film AP lateral requisition previously made and then advised on local treatments she prefers not to have liniment treatments perhaps oral analgesics or anti-inflammatories or steroids pain management or orthopedic referral or physical therapy or further imaging Tylenol as needed for headache less likely carpal tunnel syndrome she does have a goiter review prior thyroid function test (thyroid was elevated and the images showed goiter) follow up with endo after medication changes

## 2024-11-13 ENCOUNTER — HOSPITAL ENCOUNTER (OUTPATIENT)
Dept: RADIOLOGY | Facility: HOSPITAL | Age: 49
Discharge: HOME | End: 2024-11-13
Payer: COMMERCIAL

## 2024-11-13 DIAGNOSIS — M54.2 CERVICALGIA: ICD-10-CM

## 2024-11-13 PROCEDURE — 72050 X-RAY EXAM NECK SPINE 4/5VWS: CPT

## 2024-11-13 PROCEDURE — 72050 X-RAY EXAM NECK SPINE 4/5VWS: CPT | Performed by: RADIOLOGY

## 2024-12-06 ENCOUNTER — APPOINTMENT (OUTPATIENT)
Dept: OPHTHALMOLOGY | Facility: CLINIC | Age: 49
End: 2024-12-06
Payer: COMMERCIAL

## 2024-12-16 ENCOUNTER — TELEMEDICINE (OUTPATIENT)
Dept: OBSTETRICS AND GYNECOLOGY | Facility: CLINIC | Age: 49
End: 2024-12-16
Payer: COMMERCIAL

## 2024-12-16 DIAGNOSIS — N95.1 MENOPAUSAL SYMPTOMS: ICD-10-CM

## 2024-12-16 DIAGNOSIS — Z79.890 HORMONE REPLACEMENT THERAPY (POSTMENOPAUSAL): Primary | ICD-10-CM

## 2024-12-16 PROCEDURE — 3048F LDL-C <100 MG/DL: CPT | Performed by: OBSTETRICS & GYNECOLOGY

## 2024-12-16 PROCEDURE — 99213 OFFICE O/P EST LOW 20 MIN: CPT | Performed by: OBSTETRICS & GYNECOLOGY

## 2024-12-16 PROCEDURE — 3061F NEG MICROALBUMINURIA REV: CPT | Performed by: OBSTETRICS & GYNECOLOGY

## 2024-12-16 PROCEDURE — 3051F HG A1C>EQUAL 7.0%<8.0%: CPT | Performed by: OBSTETRICS & GYNECOLOGY

## 2024-12-16 PROCEDURE — 4010F ACE/ARB THERAPY RXD/TAKEN: CPT | Performed by: OBSTETRICS & GYNECOLOGY

## 2024-12-16 RX ORDER — PROGESTERONE 100 MG/1
CAPSULE ORAL
Qty: 14 CAPSULE | Refills: 3 | Status: SHIPPED | OUTPATIENT
Start: 2024-12-16

## 2024-12-16 RX ORDER — ESTRADIOL 1 MG/1
1 TABLET ORAL DAILY
Qty: 90 TABLET | Refills: 3 | Status: SHIPPED | OUTPATIENT
Start: 2024-12-16 | End: 2025-12-16

## 2024-12-16 NOTE — PROGRESS NOTES
Subjective   Patient ID: Pebbles Concepcion is a 49 y.o. female who presents for No chief complaint on file..  HPI  Rachael is a 49-year-old female here virtually to discuss hormone replacement therapy.  Menopausal symptoms and vaginal dryness have been relieved by oral estradiol now for 3 months.  She denies any vaginal bleeding.  She very much would like to continue    We discussed progesterone either cyclic or continuous.  She agrees with Prometrium 200 mg 14 days every 3 months.  If frustrated with the bleeding will call back and go with continuous progesterone replacement.  Review of Systems    Objective   Physical Exam  Deferred due to telemedicine visit  Assessment/Plan   Excellent relief from menopausal symptoms with estradiol orally daily.  Will continue    Had Prometrium 200 mg 14 days every 3 months    Follow-up as needed         Gunnar Jarrett MD 12/16/24 3:50 PM

## 2025-01-05 ENCOUNTER — HOSPITAL ENCOUNTER (EMERGENCY)
Facility: HOSPITAL | Age: 50
Discharge: HOME | End: 2025-01-05

## 2025-01-05 ENCOUNTER — APPOINTMENT (OUTPATIENT)
Dept: RADIOLOGY | Facility: HOSPITAL | Age: 50
End: 2025-01-05

## 2025-01-05 VITALS
DIASTOLIC BLOOD PRESSURE: 64 MMHG | SYSTOLIC BLOOD PRESSURE: 126 MMHG | TEMPERATURE: 97.5 F | HEIGHT: 62 IN | HEART RATE: 83 BPM | WEIGHT: 135 LBS | OXYGEN SATURATION: 98 % | BODY MASS INDEX: 24.84 KG/M2 | RESPIRATION RATE: 16 BRPM

## 2025-01-05 DIAGNOSIS — S06.0X0A CONCUSSION WITHOUT LOSS OF CONSCIOUSNESS, INITIAL ENCOUNTER: Primary | ICD-10-CM

## 2025-01-05 PROCEDURE — 70450 CT HEAD/BRAIN W/O DYE: CPT | Performed by: RADIOLOGY

## 2025-01-05 PROCEDURE — 70450 CT HEAD/BRAIN W/O DYE: CPT

## 2025-01-05 PROCEDURE — 99284 EMERGENCY DEPT VISIT MOD MDM: CPT | Mod: 25

## 2025-01-05 PROCEDURE — 2500000004 HC RX 250 GENERAL PHARMACY W/ HCPCS (ALT 636 FOR OP/ED): Performed by: NURSE PRACTITIONER

## 2025-01-05 PROCEDURE — 2500000001 HC RX 250 WO HCPCS SELF ADMINISTERED DRUGS (ALT 637 FOR MEDICARE OP): Performed by: NURSE PRACTITIONER

## 2025-01-05 RX ORDER — MECLIZINE HYDROCHLORIDE 25 MG/1
25 TABLET ORAL 3 TIMES DAILY PRN
Qty: 30 TABLET | Refills: 0 | Status: SHIPPED | OUTPATIENT
Start: 2025-01-05 | End: 2025-01-15

## 2025-01-05 RX ORDER — ONDANSETRON 4 MG/1
4 TABLET, FILM COATED ORAL EVERY 6 HOURS
Qty: 12 TABLET | Refills: 0 | Status: SHIPPED | OUTPATIENT
Start: 2025-01-05 | End: 2025-01-08

## 2025-01-05 RX ORDER — ACETAMINOPHEN 325 MG/1
975 TABLET ORAL ONCE
Status: COMPLETED | OUTPATIENT
Start: 2025-01-05 | End: 2025-01-05

## 2025-01-05 RX ORDER — ONDANSETRON 4 MG/1
4 TABLET, ORALLY DISINTEGRATING ORAL ONCE
Status: COMPLETED | OUTPATIENT
Start: 2025-01-05 | End: 2025-01-05

## 2025-01-05 RX ORDER — ACETAMINOPHEN 500 MG
1000 TABLET ORAL EVERY 6 HOURS PRN
Qty: 30 TABLET | Refills: 0 | Status: SHIPPED | OUTPATIENT
Start: 2025-01-05 | End: 2025-01-15

## 2025-01-05 RX ADMIN — ONDANSETRON 4 MG: 4 TABLET, ORALLY DISINTEGRATING ORAL at 13:31

## 2025-01-05 RX ADMIN — ACETAMINOPHEN 975 MG: 325 TABLET, FILM COATED ORAL at 14:16

## 2025-01-05 ASSESSMENT — PAIN - FUNCTIONAL ASSESSMENT: PAIN_FUNCTIONAL_ASSESSMENT: 0-10

## 2025-01-05 ASSESSMENT — PAIN SCALES - GENERAL
PAINLEVEL_OUTOF10: 8
PAINLEVEL_OUTOF10: 8

## 2025-01-05 NOTE — ED TRIAGE NOTES
Pt to ED from home for complaints of headache that started yesterday after she hit the top of her head.  Pt is also complaining of intermittent nausea since yesterday.    Pt denies any LOC  after hitting head.
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
## 2025-01-20 ENCOUNTER — APPOINTMENT (OUTPATIENT)
Dept: OPHTHALMOLOGY | Facility: CLINIC | Age: 50
End: 2025-01-20
Payer: COMMERCIAL

## 2025-03-17 ENCOUNTER — PREP FOR PROCEDURE (OUTPATIENT)
Dept: OPHTHALMOLOGY | Facility: CLINIC | Age: 50
End: 2025-03-17

## 2025-03-17 ENCOUNTER — APPOINTMENT (OUTPATIENT)
Dept: OPHTHALMOLOGY | Facility: CLINIC | Age: 50
End: 2025-03-17
Payer: COMMERCIAL

## 2025-03-17 DIAGNOSIS — E11.3522 LEFT EYE AFFECTED BY PROLIFERATIVE DIABETIC RETINOPATHY WITH TRACTION RETINAL DETACHMENT INVOLVING MACULA, ASSOCIATED WITH TYPE 2 DIABETES MELLITUS (MULTI): Primary | ICD-10-CM

## 2025-03-17 DIAGNOSIS — H33.22: Primary | ICD-10-CM

## 2025-03-17 DIAGNOSIS — E11.3299 MILD NONPROLIFERATIVE DIABETIC RETINOPATHY ASSOCIATED WITH TYPE 2 DIABETES MELLITUS, MACULAR EDEMA PRESENCE UNSPECIFIED, UNSPECIFIED LATERALITY: ICD-10-CM

## 2025-03-17 PROCEDURE — 99214 OFFICE O/P EST MOD 30 MIN: CPT

## 2025-03-17 PROCEDURE — 92250 FUNDUS PHOTOGRAPHY W/I&R: CPT

## 2025-03-17 RX ORDER — TROPICAMIDE 10 MG/ML
1 SOLUTION/ DROPS OPHTHALMIC
OUTPATIENT
Start: 2025-03-17 | End: 2025-03-17

## 2025-03-17 RX ORDER — PROPARACAINE HYDROCHLORIDE 5 MG/ML
1 SOLUTION/ DROPS OPHTHALMIC ONCE
OUTPATIENT
Start: 2025-03-17 | End: 2025-03-17

## 2025-03-17 RX ORDER — PHENYLEPHRINE HYDROCHLORIDE 25 MG/ML
1 SOLUTION/ DROPS OPHTHALMIC
OUTPATIENT
Start: 2025-03-17 | End: 2025-03-17

## 2025-03-17 ASSESSMENT — VISUAL ACUITY
OD_CC: 20/25-2
METHOD: SNELLEN - LINEAR

## 2025-03-17 ASSESSMENT — EXTERNAL EXAM - RIGHT EYE: OD_EXAM: NORMAL

## 2025-03-17 ASSESSMENT — CONF VISUAL FIELD
OS_SUPERIOR_TEMPORAL_RESTRICTION: 2
OS_INFERIOR_NASAL_RESTRICTION: 1
OS_SUPERIOR_NASAL_RESTRICTION: 2
OD_SUPERIOR_NASAL_RESTRICTION: 0
OD_NORMAL: 1
OD_INFERIOR_NASAL_RESTRICTION: 0
OD_SUPERIOR_TEMPORAL_RESTRICTION: 0
OS_INFERIOR_TEMPORAL_RESTRICTION: 1
OD_INFERIOR_TEMPORAL_RESTRICTION: 0

## 2025-03-17 ASSESSMENT — SLIT LAMP EXAM - LIDS
COMMENTS: GOOD POSITION
COMMENTS: GOOD POSITION

## 2025-03-17 ASSESSMENT — TONOMETRY
OD_IOP_MMHG: 18
IOP_METHOD: GOLDMANN APPLANATION
OS_IOP_MMHG: 17

## 2025-03-17 ASSESSMENT — CUP TO DISC RATIO
OD_RATIO: .3
OS_RATIO: .3

## 2025-03-17 ASSESSMENT — ENCOUNTER SYMPTOMS: EYES NEGATIVE: 1

## 2025-03-17 ASSESSMENT — EXTERNAL EXAM - LEFT EYE: OS_EXAM: NORMAL

## 2025-03-17 NOTE — PROGRESS NOTES
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     #Plan#:   - Mac off TRD; long standing (over 10 years).   - Discussed risks/benefits/alteranatives of treatment options. Discussed guarded prognosis with surgery considering long standing history. Complications include infection, retinal detachment, severe bleeding, glaucoma (increased pressure) and cataract. Patient would like to proceed with surgery  - Emphasized the importance of blood glucose, BP, and cholestrol level control  - Will get PA for STAS or CARISSA  - RTC for one injection 4-5 days prior to surgery     Proliferative diabetic retinopathy without macular edema in type II DM, right eyeE11.1535  S/p PPV EL (12 years ago)      - OCT 03/17/25   --- OD: Normal foveal countor, retinal thickness and retinal laminations.     #Plan#:   - Emphasized the importance of blood glucose, BP, and cholestrol level control  - Discussed risks/benefits/alteranatives of treatment options.   - Observe  - RTC in 3 months

## 2025-03-17 NOTE — H&P
History Of Present Illness  Pebbles Concepcion is a 49 y.o. female presenting with TRD OS.     Past Medical History  She has a past medical history of Diabetes mellitus (Multi), Encounter for general adult medical examination without abnormal findings (2019), Personal history of other diseases of the digestive system (2017), RPE (retinal pigment epithelium) atrophy, and TRD (traction retinal detachment), left.    Surgical History  She has a past surgical history that includes  section, classic (2013); Other surgical history (2013); and Pars plana vitrectomy (Right, 2012).     Social History  She reports that she has never smoked. She has never used smokeless tobacco. She reports that she does not drink alcohol and does not use drugs.     Allergies  Patient has no known allergies.    ROS  Patient denies ocular pain, redness, discharge, decreased vision, double vision, blind spots, flashes, or floaters.     Physical Exam  Not recorded          Assessment/Plan       PPV MP EL possible intraocular tamponade       I spent 10 minutes in the professional and overall care of this patient.      Amy Read MD PhD

## 2025-03-28 DIAGNOSIS — E05.00 GRAVES DISEASE: Primary | ICD-10-CM

## 2025-03-28 LAB
ALBUMIN SERPL-MCNC: 4.6 G/DL (ref 3.6–5.1)
ALP SERPL-CCNC: 78 U/L (ref 37–153)
ALT SERPL-CCNC: 11 U/L (ref 6–29)
ANION GAP SERPL CALCULATED.4IONS-SCNC: 15 MMOL/L (CALC) (ref 7–17)
AST SERPL-CCNC: 17 U/L (ref 10–35)
BILIRUB SERPL-MCNC: 0.4 MG/DL (ref 0.2–1.2)
BUN SERPL-MCNC: 23 MG/DL (ref 7–25)
CALCIUM SERPL-MCNC: 10 MG/DL (ref 8.6–10.4)
CHLORIDE SERPL-SCNC: 99 MMOL/L (ref 98–110)
CO2 SERPL-SCNC: 23 MMOL/L (ref 20–32)
CREAT SERPL-MCNC: 0.7 MG/DL (ref 0.5–1.03)
EGFRCR SERPLBLD CKD-EPI 2021: 105 ML/MIN/1.73M2
GLUCOSE SERPL-MCNC: 92 MG/DL (ref 65–99)
POTASSIUM SERPL-SCNC: 4.3 MMOL/L (ref 3.5–5.3)
PROT SERPL-MCNC: 7.7 G/DL (ref 6.1–8.1)
SODIUM SERPL-SCNC: 137 MMOL/L (ref 135–146)
T3 SERPL-MCNC: 47 NG/DL (ref 76–181)
T4 FREE SERPL-MCNC: 0.3 NG/DL (ref 0.8–1.8)
TSH SERPL-ACNC: 80.92 MIU/L

## 2025-03-31 ENCOUNTER — OFFICE VISIT (OUTPATIENT)
Dept: ENDOCRINOLOGY | Facility: HOSPITAL | Age: 50
End: 2025-03-31
Payer: COMMERCIAL

## 2025-03-31 ENCOUNTER — TELEPHONE (OUTPATIENT)
Dept: OBSTETRICS AND GYNECOLOGY | Facility: CLINIC | Age: 50
End: 2025-03-31
Payer: COMMERCIAL

## 2025-03-31 VITALS
BODY MASS INDEX: 24.69 KG/M2 | HEART RATE: 79 BPM | SYSTOLIC BLOOD PRESSURE: 142 MMHG | HEIGHT: 62 IN | DIASTOLIC BLOOD PRESSURE: 80 MMHG | TEMPERATURE: 96.1 F

## 2025-03-31 DIAGNOSIS — E11.9 TYPE 2 DIABETES MELLITUS WITHOUT COMPLICATION, WITHOUT LONG-TERM CURRENT USE OF INSULIN: ICD-10-CM

## 2025-03-31 DIAGNOSIS — E05.00 GRAVES DISEASE: Primary | ICD-10-CM

## 2025-03-31 LAB — GLUCOSE BLD MANUAL STRIP-MCNC: 156 MG/DL (ref 74–99)

## 2025-03-31 PROCEDURE — 3077F SYST BP >= 140 MM HG: CPT | Performed by: STUDENT IN AN ORGANIZED HEALTH CARE EDUCATION/TRAINING PROGRAM

## 2025-03-31 PROCEDURE — 99214 OFFICE O/P EST MOD 30 MIN: CPT | Performed by: STUDENT IN AN ORGANIZED HEALTH CARE EDUCATION/TRAINING PROGRAM

## 2025-03-31 PROCEDURE — 82947 ASSAY GLUCOSE BLOOD QUANT: CPT | Performed by: STUDENT IN AN ORGANIZED HEALTH CARE EDUCATION/TRAINING PROGRAM

## 2025-03-31 PROCEDURE — 4010F ACE/ARB THERAPY RXD/TAKEN: CPT | Performed by: STUDENT IN AN ORGANIZED HEALTH CARE EDUCATION/TRAINING PROGRAM

## 2025-03-31 PROCEDURE — 36416 COLLJ CAPILLARY BLOOD SPEC: CPT | Performed by: STUDENT IN AN ORGANIZED HEALTH CARE EDUCATION/TRAINING PROGRAM

## 2025-03-31 PROCEDURE — 3079F DIAST BP 80-89 MM HG: CPT | Performed by: STUDENT IN AN ORGANIZED HEALTH CARE EDUCATION/TRAINING PROGRAM

## 2025-03-31 ASSESSMENT — ENCOUNTER SYMPTOMS
LOSS OF SENSATION IN FEET: 0
OCCASIONAL FEELINGS OF UNSTEADINESS: 0
DEPRESSION: 0

## 2025-03-31 ASSESSMENT — PATIENT HEALTH QUESTIONNAIRE - PHQ9
1. LITTLE INTEREST OR PLEASURE IN DOING THINGS: NOT AT ALL
2. FEELING DOWN, DEPRESSED OR HOPELESS: NOT AT ALL
SUM OF ALL RESPONSES TO PHQ9 QUESTIONS 1 AND 2: 0

## 2025-03-31 ASSESSMENT — PAIN SCALES - GENERAL: PAINLEVEL_OUTOF10: 0-NO PAIN

## 2025-03-31 NOTE — PATIENT INSTRUCTIONS
For thyroid  Hold methimazole  Blood work in 1 week  Think about surgery    For diabetes  Continue metformin Jardiance and ozempic  Continue to check blood sugar  Follow with ophthalmology  A1c in 1 week    RTC in August

## 2025-03-31 NOTE — TELEPHONE ENCOUNTER
THIS PATIENT WOULD LIKE A CALL BACK REGARDING THE INSTRUCTIONS ON TAKING THE progesterone (Prometrium) 100 mg capsule AND HER NOT RECEIVING HER CYCLE YET. SHE CAN BE REACHED [phone].      THANK YOU

## 2025-03-31 NOTE — PROGRESS NOTES
Patient coming in for follow up for T2DM    Subjective   Pebbles Concepcion is a 49 y.o. female who presents for follow up for Type 2 diabetes mellitus.   The initial diagnosis of diabetes was made {ago/age:64137}.   Lab Results   Component Value Date    HGBA1C 7.0 (H) 09/26/2024      Mrs. Concepcion is a 49 year old lady with history of diabetes mellitus type 2 hyperlipidemia low vitamin D hypertension and Graves disease coming for follow up.  Meds: Metformin, Jardiance, Pioglitazone and Ozempic     # Graves Disease  Was started on methimazole in July 2021. at that time TSH < 0.01 and FT4: 3.13 and T3: 237 TSI was 3.9  Was on methimazole 20 mg BID and metop 25 mg BID   Labs done in October showed a TSH < 0.01, FT4: 0.87 and FT4: 104.  Was on methimazole 10 mg 2 tabs in am and 1 in pm since October.  Blood tests TSH: 111 and FT4: < 0.25 on Friday May 3rd so we asked patient to hold methimazole  Methimazole was resumed in June patient did not follow-up until November blood work in November on methimazole 10 mg was TSH of 0.43 free T4 of 0.77 and T3 of 110 TSI was 2.4 we decrease methimazole to 5 mg repeated after 6 weeks TSH was 2.58 Free T4 0.85 and T3 123  November 7th stopped methimazole  Blood work in March showed a TSH: 3.42, FT4: 0.94 and T3: 93  TSI negative in October    TSI 3.5   No excess tearing  No double vision  No redness  No sand like feeling  Energy: fine/ Always tired. Not more than usual  Sleeps: well  On methimazole 10 mg BID  Currently having isuses with menopause started in summer  Constipation sometimes takes 2 weeks takes miralax and it will work     # T2DM  Lab Results   Component Value Date    HGBA1C 7.0 (H) 09/26/2024      We saw her in October and we started Jardiance and ozempic 1mg   on metformin 1000 mg BID   Jardiance 25 mg once a day had 2-3 yeats infections in total in the past 2 years  Estradiol oral   Ozempic 1 mg once a week.   Weight stable per patient   Hx of pancreatitis years and years  "ago (discussed with patient risk with GLP1RA)  Last ophthalmology in 2 weeks.   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    Patient is using: {glucometer/continuous glucose monitor:62183}    Hypoglycemia frequency: ***  Hypoglycemia awareness: {YES/NO:68908}    Diet:  Breakfast:  Lunch:  Dinner:  Snack:  Beverages:    Known complications due to diabetes included {Diagnoses; complications diabetes:1215}    Cardiovascular risk factors include {risk factors heart disease:510}. The patient {is/is not:42898} on an ACE inhibitor or angiotensin II receptor blocker.      Foot Exam:   Eye Exam:  Lipid Panel:  Urine Albumin:    The patient {HAS HAS NOT:71063} been previously hospitalized due to diabetic ketoacidosis.     Current symptoms/problems include {Symptoms; diabetes:80391}. Her {course:87573}.         Exercise: {exercise:23299}     Review of Systems    Objective   /80 (BP Location: Left arm, Patient Position: Sitting, BP Cuff Size: Adult)   Pulse 79   Temp 35.6 °C (96.1 °F) (Temporal)   Ht 1.575 m (5' 2\")   LMP  (LMP Unknown)   BMI 24.69 kg/m²   Physical Exam    Lab Review  GLUCOSE (mg/dL)   Date Value   03/27/2025 92     Glucose (mg/dL)   Date Value   09/26/2024 172 (H)   08/16/2024 101 (H)   03/04/2024 144 (H)     Hemoglobin A1C (%)   Date Value   09/26/2024 7.0 (H)   03/04/2024 " "6.7 (H)   10/18/2023 10.3 (H)     CARBON DIOXIDE (mmol/L)   Date Value   03/27/2025 23     Bicarbonate (mmol/L)   Date Value   09/26/2024 23   08/16/2024 26   03/04/2024 28     UREA NITROGEN (BUN) (mg/dL)   Date Value   03/27/2025 23     Urea Nitrogen (mg/dL)   Date Value   09/26/2024 24 (H)   08/16/2024 19   03/04/2024 18     Creatinine (mg/dL)   Date Value   09/26/2024 0.97   08/16/2024 0.44 (L)   03/04/2024 0.70     CREATININE (mg/dL)   Date Value   03/27/2025 0.70     Lab Results   Component Value Date    CHOL 207 (H) 09/26/2024    CHOL 200 (H) 10/18/2023    CHOL 160 11/19/2020     Lab Results   Component Value Date    HDL 96.6 09/26/2024    HDL 87.3 10/18/2023    HDL 81.6 11/19/2020     Lab Results   Component Value Date    LDLCALC 95 09/26/2024    LDLCALC 103 (H) 10/18/2023     Lab Results   Component Value Date    TRIG 78 09/26/2024    TRIG 50 10/18/2023    TRIG 60 11/19/2020     No components found for: \"CHOLHDL\"   Lab Results   Component Value Date    TSH 80.92 (H) 03/27/2025     No results found for: \"ALBUR\", \"[id number]\"     Health Maintenance:       Assessment/Plan   There are no diagnoses linked to this encounter.  Type 2 diabetes mellitus, is {at goal/not at goal:87245}. ***  {Assess/Plan SmartLinks (Optional):88050}          Follow up: I recommend diabetes care be {Time; 1 month to 1 year:76935::\"3 months\"}.  " a day had 2-3 yeats infections in total in the past 2 years  Estradiol oral   Ozempic 1 mg once a week.   Weight stable per patient   Hx of pancreatitis years and years ago (discussed with patient risk with GLP1RA)  Last ophthalmology in 2 weeks.   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, left eyeE11.3522  Proliferative diabetic retinopathy (PDR) with macular edema associated with DM II, left eye  Has retinal detachment will get surgery in april  No Numbness and tingling in feet   Was been on lisinopril for years stopped in June cough started afterwards now no cough  Plan  Continue metformin Jardiance and ozempic  Continue to check blood sugar  Follow with ophthalmology  A1c in 1 week    RTC in August  Assessment & Plan  Graves disease    Orders:    Thyroid Stimulating Hormone; Future    Thyroxine, Free; Future    Triiodothyronine, Total; Future    Thyroid Stimulating Immunoglobulin; Future    Comprehensive Metabolic Panel; Future    US thyroid; Future    Type 2 diabetes mellitus without complication, without long-term current use of insulin    Orders:    Comprehensive Metabolic Panel; Future    Hemoglobin A1C; Future

## 2025-04-02 NOTE — TELEPHONE ENCOUNTER
Nurse notified by Dr. Jarrett that he spoke to patient and verbalized to her that if Progesterone does not trigger a menses that is okay just keep on schedule. Patient verified that she received this information and had no further questions, comments or concerns.    CORY Newsome

## 2025-04-07 DIAGNOSIS — E11.9 TYPE 2 DIABETES MELLITUS WITHOUT COMPLICATION, WITHOUT LONG-TERM CURRENT USE OF INSULIN: ICD-10-CM

## 2025-04-07 DIAGNOSIS — E05.00 GRAVES DISEASE: ICD-10-CM

## 2025-04-07 LAB
ALBUMIN SERPL-MCNC: 4.4 G/DL (ref 3.6–5.1)
ALP SERPL-CCNC: 74 U/L (ref 37–153)
ALT SERPL-CCNC: 10 U/L (ref 6–29)
ANION GAP SERPL CALCULATED.4IONS-SCNC: 14 MMOL/L (CALC) (ref 7–17)
AST SERPL-CCNC: 16 U/L (ref 10–35)
BILIRUB SERPL-MCNC: 0.5 MG/DL (ref 0.2–1.2)
BUN SERPL-MCNC: 17 MG/DL (ref 7–25)
CALCIUM SERPL-MCNC: 9.6 MG/DL (ref 8.6–10.4)
CHLORIDE SERPL-SCNC: 101 MMOL/L (ref 98–110)
CO2 SERPL-SCNC: 26 MMOL/L (ref 20–32)
CREAT SERPL-MCNC: 0.65 MG/DL (ref 0.5–1.03)
EGFRCR SERPLBLD CKD-EPI 2021: 107 ML/MIN/1.73M2
EST. AVERAGE GLUCOSE BLD GHB EST-MCNC: 137 MG/DL
EST. AVERAGE GLUCOSE BLD GHB EST-SCNC: 7.6 MMOL/L
GLUCOSE SERPL-MCNC: 102 MG/DL (ref 65–99)
HBA1C MFR BLD: 6.4 % OF TOTAL HGB
POTASSIUM SERPL-SCNC: 4.3 MMOL/L (ref 3.5–5.3)
PROT SERPL-MCNC: 7.4 G/DL (ref 6.1–8.1)
SODIUM SERPL-SCNC: 141 MMOL/L (ref 135–146)
T3 SERPL-MCNC: 98 NG/DL (ref 76–181)
T4 FREE SERPL-MCNC: 0.4 NG/DL (ref 0.8–1.8)
TSH SERPL-ACNC: 48.87 MIU/L
TSI SER-ACNC: 389 % BASELINE

## 2025-04-08 ENCOUNTER — HOSPITAL ENCOUNTER (OUTPATIENT)
Dept: RADIOLOGY | Facility: HOSPITAL | Age: 50
Discharge: HOME | End: 2025-04-08
Payer: COMMERCIAL

## 2025-04-08 DIAGNOSIS — E05.00 GRAVES DISEASE: ICD-10-CM

## 2025-04-08 PROCEDURE — 76536 US EXAM OF HEAD AND NECK: CPT

## 2025-04-08 PROCEDURE — 76536 US EXAM OF HEAD AND NECK: CPT | Performed by: RADIOLOGY

## 2025-04-15 ENCOUNTER — APPOINTMENT (OUTPATIENT)
Dept: OPHTHALMOLOGY | Facility: CLINIC | Age: 50
End: 2025-04-15
Payer: COMMERCIAL

## 2025-04-15 NOTE — ASSESSMENT & PLAN NOTE
Orders:    Thyroid Stimulating Hormone; Future    Thyroxine, Free; Future    Triiodothyronine, Total; Future    Thyroid Stimulating Immunoglobulin; Future    Comprehensive Metabolic Panel; Future    US thyroid; Future

## 2025-04-17 DIAGNOSIS — Z79.890 HORMONE REPLACEMENT THERAPY (POSTMENOPAUSAL): Primary | ICD-10-CM

## 2025-04-17 RX ORDER — PROGESTERONE 200 MG/1
200 CAPSULE ORAL NIGHTLY
Qty: 14 CAPSULE | Refills: 3 | Status: SHIPPED | OUTPATIENT
Start: 2025-04-17 | End: 2026-04-17

## 2025-04-18 LAB
T3 SERPL-MCNC: 109 NG/DL (ref 76–181)
T4 FREE SERPL-MCNC: 1.1 NG/DL (ref 0.8–1.8)
TSH SERPL-ACNC: 1.51 MIU/L

## 2025-04-25 ENCOUNTER — ANESTHESIA EVENT (OUTPATIENT)
Dept: OPERATING ROOM | Facility: CLINIC | Age: 50
End: 2025-04-25
Payer: COMMERCIAL

## 2025-04-28 ENCOUNTER — HOSPITAL ENCOUNTER (OUTPATIENT)
Facility: CLINIC | Age: 50
Setting detail: OUTPATIENT SURGERY
Discharge: HOME | End: 2025-04-28
Payer: COMMERCIAL

## 2025-04-28 ENCOUNTER — ANESTHESIA (OUTPATIENT)
Dept: OPERATING ROOM | Facility: CLINIC | Age: 50
End: 2025-04-28
Payer: COMMERCIAL

## 2025-04-28 VITALS
BODY MASS INDEX: 25.92 KG/M2 | DIASTOLIC BLOOD PRESSURE: 72 MMHG | HEART RATE: 72 BPM | TEMPERATURE: 97.3 F | HEIGHT: 62 IN | WEIGHT: 140.87 LBS | RESPIRATION RATE: 16 BRPM | SYSTOLIC BLOOD PRESSURE: 163 MMHG | OXYGEN SATURATION: 100 %

## 2025-04-28 DIAGNOSIS — H43.12 VITREOUS HEMORRHAGE OF LEFT EYE (MULTI): ICD-10-CM

## 2025-04-28 DIAGNOSIS — E11.9 TYPE 2 DIABETES MELLITUS WITHOUT COMPLICATION, WITHOUT LONG-TERM CURRENT USE OF INSULIN: ICD-10-CM

## 2025-04-28 DIAGNOSIS — H33.22: Primary | ICD-10-CM

## 2025-04-28 DIAGNOSIS — E11.3522 LEFT EYE AFFECTED BY PROLIFERATIVE DIABETIC RETINOPATHY WITH TRACTION RETINAL DETACHMENT INVOLVING MACULA, ASSOCIATED WITH TYPE 2 DIABETES MELLITUS: ICD-10-CM

## 2025-04-28 LAB — GLUCOSE BLD MANUAL STRIP-MCNC: 105 MG/DL (ref 74–99)

## 2025-04-28 PROCEDURE — A67113 PR REPAIR COMPLEX RETINA DETACH VITRECTOMY AND MEMB PEEL: Performed by: NURSE ANESTHETIST, CERTIFIED REGISTERED

## 2025-04-28 PROCEDURE — 82947 ASSAY GLUCOSE BLOOD QUANT: CPT

## 2025-04-28 PROCEDURE — 3600000008 HC OR TIME - EACH INCREMENTAL 1 MINUTE - PROCEDURE LEVEL THREE

## 2025-04-28 PROCEDURE — 2500000005 HC RX 250 GENERAL PHARMACY W/O HCPCS

## 2025-04-28 PROCEDURE — 3600000003 HC OR TIME - INITIAL BASE CHARGE - PROCEDURE LEVEL THREE

## 2025-04-28 PROCEDURE — 67113 REPAIR RETINAL DETACH CPLX: CPT

## 2025-04-28 PROCEDURE — 3700000002 HC GENERAL ANESTHESIA TIME - EACH INCREMENTAL 1 MINUTE

## 2025-04-28 PROCEDURE — 2720000007 HC OR 272 NO HCPCS

## 2025-04-28 PROCEDURE — 7100000010 HC PHASE TWO TIME - EACH INCREMENTAL 1 MINUTE

## 2025-04-28 PROCEDURE — A67113 PR REPAIR COMPLEX RETINA DETACH VITRECTOMY AND MEMB PEEL: Performed by: STUDENT IN AN ORGANIZED HEALTH CARE EDUCATION/TRAINING PROGRAM

## 2025-04-28 PROCEDURE — 2500000004 HC RX 250 GENERAL PHARMACY W/ HCPCS (ALT 636 FOR OP/ED): Performed by: NURSE ANESTHETIST, CERTIFIED REGISTERED

## 2025-04-28 PROCEDURE — 3700000001 HC GENERAL ANESTHESIA TIME - INITIAL BASE CHARGE

## 2025-04-28 PROCEDURE — 7100000009 HC PHASE TWO TIME - INITIAL BASE CHARGE

## 2025-04-28 PROCEDURE — 2500000004 HC RX 250 GENERAL PHARMACY W/ HCPCS (ALT 636 FOR OP/ED): Mod: JZ

## 2025-04-28 DEVICE — CYLINDER, C3F8 GAS: Type: IMPLANTABLE DEVICE | Site: EYE | Status: FUNCTIONAL

## 2025-04-28 RX ORDER — HYDRALAZINE HYDROCHLORIDE 20 MG/ML
5 INJECTION INTRAMUSCULAR; INTRAVENOUS EVERY 30 MIN PRN
Status: DISCONTINUED | OUTPATIENT
Start: 2025-04-28 | End: 2025-04-28 | Stop reason: HOSPADM

## 2025-04-28 RX ORDER — TROPICAMIDE 10 MG/ML
1 SOLUTION/ DROPS OPHTHALMIC
Status: COMPLETED | OUTPATIENT
Start: 2025-04-28 | End: 2025-04-28

## 2025-04-28 RX ORDER — POVIDONE-IODINE 5 %
SOLUTION, NON-ORAL OPHTHALMIC (EYE) AS NEEDED
Status: DISCONTINUED | OUTPATIENT
Start: 2025-04-28 | End: 2025-04-28 | Stop reason: HOSPADM

## 2025-04-28 RX ORDER — PREDNISOLONE ACETATE 10 MG/ML
1 SUSPENSION/ DROPS OPHTHALMIC 4 TIMES DAILY
Qty: 10 ML | Refills: 1 | Status: SHIPPED | OUTPATIENT
Start: 2025-04-28 | End: 2025-05-28

## 2025-04-28 RX ORDER — ACETAMINOPHEN 10 MG/ML
INJECTION, SOLUTION INTRAVENOUS AS NEEDED
Status: DISCONTINUED | OUTPATIENT
Start: 2025-04-28 | End: 2025-04-28

## 2025-04-28 RX ORDER — ATROPINE SULFATE 10 MG/ML
1 SOLUTION/ DROPS OPHTHALMIC 2 TIMES DAILY
Qty: 5 ML | Refills: 0 | Status: SHIPPED | OUTPATIENT
Start: 2025-04-28 | End: 2025-05-28

## 2025-04-28 RX ORDER — PROPARACAINE HYDROCHLORIDE 5 MG/ML
1 SOLUTION/ DROPS OPHTHALMIC ONCE
Status: COMPLETED | OUTPATIENT
Start: 2025-04-28 | End: 2025-04-28

## 2025-04-28 RX ORDER — TRIAMCINOLONE ACETONIDE 40 MG/ML
INJECTION, SUSPENSION INTRA-ARTICULAR; INTRAMUSCULAR AS NEEDED
Status: DISCONTINUED | OUTPATIENT
Start: 2025-04-28 | End: 2025-04-28 | Stop reason: HOSPADM

## 2025-04-28 RX ORDER — WATER 1 ML/ML
INJECTION IRRIGATION AS NEEDED
Status: DISCONTINUED | OUTPATIENT
Start: 2025-04-28 | End: 2025-04-28 | Stop reason: HOSPADM

## 2025-04-28 RX ORDER — CEFAZOLIN 1 G/1
INJECTION, POWDER, FOR SOLUTION INTRAVENOUS AS NEEDED
Status: DISCONTINUED | OUTPATIENT
Start: 2025-04-28 | End: 2025-04-28 | Stop reason: HOSPADM

## 2025-04-28 RX ORDER — OFLOXACIN 3 MG/ML
1 SOLUTION/ DROPS OPHTHALMIC 4 TIMES DAILY
Qty: 5 ML | Refills: 0 | Status: SHIPPED | OUTPATIENT
Start: 2025-04-28 | End: 2025-05-05

## 2025-04-28 RX ORDER — DEXAMETHASONE SODIUM PHOSPHATE 10 MG/ML
INJECTION INTRAMUSCULAR; INTRAVENOUS AS NEEDED
Status: DISCONTINUED | OUTPATIENT
Start: 2025-04-28 | End: 2025-04-28 | Stop reason: HOSPADM

## 2025-04-28 RX ORDER — LIDOCAINE HYDROCHLORIDE 20 MG/ML
INJECTION, SOLUTION INFILTRATION; PERINEURAL AS NEEDED
Status: DISCONTINUED | OUTPATIENT
Start: 2025-04-28 | End: 2025-04-28 | Stop reason: HOSPADM

## 2025-04-28 RX ORDER — PHENYLEPHRINE HYDROCHLORIDE 25 MG/ML
1 SOLUTION/ DROPS OPHTHALMIC
Status: COMPLETED | OUTPATIENT
Start: 2025-04-28 | End: 2025-04-28

## 2025-04-28 RX ORDER — MIDAZOLAM HYDROCHLORIDE 1 MG/ML
INJECTION, SOLUTION INTRAMUSCULAR; INTRAVENOUS AS NEEDED
Status: DISCONTINUED | OUTPATIENT
Start: 2025-04-28 | End: 2025-04-28

## 2025-04-28 RX ORDER — BUPIVACAINE HYDROCHLORIDE 7.5 MG/ML
INJECTION, SOLUTION EPIDURAL; RETROBULBAR AS NEEDED
Status: DISCONTINUED | OUTPATIENT
Start: 2025-04-28 | End: 2025-04-28 | Stop reason: HOSPADM

## 2025-04-28 RX ORDER — PROPOFOL 10 MG/ML
INJECTION, EMULSION INTRAVENOUS AS NEEDED
Status: DISCONTINUED | OUTPATIENT
Start: 2025-04-28 | End: 2025-04-28

## 2025-04-28 RX ORDER — FENTANYL CITRATE 50 UG/ML
INJECTION, SOLUTION INTRAMUSCULAR; INTRAVENOUS AS NEEDED
Status: DISCONTINUED | OUTPATIENT
Start: 2025-04-28 | End: 2025-04-28

## 2025-04-28 RX ORDER — ACETAMINOPHEN 325 MG/1
650 TABLET ORAL ONCE
Status: DISCONTINUED | OUTPATIENT
Start: 2025-04-28 | End: 2025-04-28 | Stop reason: HOSPADM

## 2025-04-28 RX ADMIN — PHENYLEPHRINE HYDROCHLORIDE 1 DROP: 25 SOLUTION/ DROPS OPHTHALMIC at 09:32

## 2025-04-28 RX ADMIN — PHENYLEPHRINE HYDROCHLORIDE 1 DROP: 25 SOLUTION/ DROPS OPHTHALMIC at 09:22

## 2025-04-28 RX ADMIN — PHENYLEPHRINE HYDROCHLORIDE 1 DROP: 25 SOLUTION/ DROPS OPHTHALMIC at 09:27

## 2025-04-28 RX ADMIN — PROPOFOL 40 MG: 10 INJECTION, EMULSION INTRAVENOUS at 09:42

## 2025-04-28 RX ADMIN — PROPARACAINE HYDROCHLORIDE 1 DROP: 5 SOLUTION/ DROPS OPHTHALMIC at 09:15

## 2025-04-28 RX ADMIN — FENTANYL CITRATE 50 MCG: 50 INJECTION, SOLUTION INTRAMUSCULAR; INTRAVENOUS at 11:11

## 2025-04-28 RX ADMIN — FENTANYL CITRATE 50 MCG: 50 INJECTION, SOLUTION INTRAMUSCULAR; INTRAVENOUS at 09:41

## 2025-04-28 RX ADMIN — TROPICAMIDE 1 DROP: 10 SOLUTION/ DROPS OPHTHALMIC at 09:32

## 2025-04-28 RX ADMIN — TROPICAMIDE 1 DROP: 10 SOLUTION/ DROPS OPHTHALMIC at 09:22

## 2025-04-28 RX ADMIN — TROPICAMIDE 1 DROP: 10 SOLUTION/ DROPS OPHTHALMIC at 09:27

## 2025-04-28 RX ADMIN — MIDAZOLAM 2 MG: 1 INJECTION INTRAMUSCULAR; INTRAVENOUS at 09:40

## 2025-04-28 RX ADMIN — ACETAMINOPHEN 1000 MG: 10 INJECTION, SOLUTION INTRAVENOUS at 11:28

## 2025-04-28 ASSESSMENT — COLUMBIA-SUICIDE SEVERITY RATING SCALE - C-SSRS
1. IN THE PAST MONTH, HAVE YOU WISHED YOU WERE DEAD OR WISHED YOU COULD GO TO SLEEP AND NOT WAKE UP?: NO
2. HAVE YOU ACTUALLY HAD ANY THOUGHTS OF KILLING YOURSELF?: NO
6. HAVE YOU EVER DONE ANYTHING, STARTED TO DO ANYTHING, OR PREPARED TO DO ANYTHING TO END YOUR LIFE?: NO

## 2025-04-28 ASSESSMENT — PAIN SCALES - GENERAL: PAINLEVEL_OUTOF10: 0 - NO PAIN

## 2025-04-28 ASSESSMENT — PAIN - FUNCTIONAL ASSESSMENT
PAIN_FUNCTIONAL_ASSESSMENT: 0-10
PAIN_FUNCTIONAL_ASSESSMENT: 0-10

## 2025-04-28 NOTE — ANESTHESIA PREPROCEDURE EVALUATION
Patient: Pebbles Concepcion    Procedure Information       Anesthesia Start Date/Time: 25 0939    Procedure: Vitrectomy, membrane peel, endolaser (Left: Eye)    Location: [facility] [place] [facility] OR  [facility] [place] [facility]    Surgeons: Amy Read MD PhD            Relevant Problems   Cardiac   (+) Hyperlipidemia   (+) Hypertension      Pulmonary   (+) Asthmatic bronchitis (HHS-HCC)      Neuro   (+) Bilateral carpal tunnel syndrome   (+) Carpal tunnel syndrome      GI   (+) Gastroesophageal reflux disease      /Renal   (+) Urinary tract infection      Endocrine   (+) Diabetic retinopathy (Multi)   (+) Diffuse goiter   (+) Graves disease   (+) Hyperthyroidism   (+) Nontoxic uninodular goiter   (+) Thyromegaly   (+) Type 2 diabetes mellitus      Hematology   (+) Anemia   (+) Iron deficiency anemia      Musculoskeletal   (+) Bilateral carpal tunnel syndrome   (+) Carpal tunnel syndrome      HEENT   (+) Acute maxillary sinusitis   (+) Sinusitis      ID   (+) Urinary tract infection      GYN   (+) Menorrhagia       Clinical information reviewed:   Tobacco  Allergies  Meds   Med Hx  Surg Hx  OB Status  Fam Hx  Soc   Hx        NPO Detail:  NPO/Void Status  Date of Last Liquid: 25  Time of Last Liquid:   Date of Last Solid: 25  Time of Last Solid:          Physical Exam    Airway  Mallampati: III  TM distance: >3 FB  Mouth openin finger widths     Cardiovascular - normal exam   Dental    Pulmonary - normal exam   Abdominal          Anesthesia Plan    History of general anesthesia?: yes  History of complications of general anesthesia?: no    ASA 2     MAC     intravenous induction   Anesthetic plan and risks discussed with patient.  Use of blood products discussed with patient who.    Plan discussed with attending and CRNA.     Statement Selected

## 2025-04-28 NOTE — ANESTHESIA POSTPROCEDURE EVALUATION
Patient: Pebbles Concepcion    Procedure Summary       Date: 04/28/25 [place] [facility] [place] [facility]    Anesthesia Start: 0939 Anesthesia Stop: 1137    Procedure: Vitrectomy, membrane peel, endolaser, lensectomy, 14% C3F8 gas (Left: Eye) Diagnosis:       Left eye affected by proliferative diabetic retinopathy with traction retinal detachment involving macula, associated with type 2 diabetes mellitus      Vitreous hemorrhage of left eye (Multi)      (Left eye affected by proliferative diabetic retinopathy with traction retinal detachment involving macula, associated with type 2 diabetes mellitus (Multi) [E11.3522])    Surgeons: Amy Read MD PhD Responsible Provider: Julián Centeno DO    Anesthesia Type: MAC ASA Status: 2            Anesthesia Type: MAC    Vitals Value Taken Time   /72 04/28/25 12:01   Temp 36.3 °C (97.3 °F) 04/28/25 12:01   Pulse 72 04/28/25 12:01   Resp 16 04/28/25 12:01   SpO2 100 % 04/28/25 12:01       Anesthesia Post Evaluation    Patient location during evaluation: PACU  Patient participation: complete - patient participated  Level of consciousness: awake and alert  Pain management: adequate  Airway patency: patent  Cardiovascular status: acceptable  Respiratory status: acceptable  Hydration status: acceptable  Postoperative Nausea and Vomiting: none        No notable events documented.

## 2025-04-28 NOTE — BRIEF OP NOTE
Date: 2025  OR Location: [facility] OR    Name: Pebbles Concepcion, : 1975, Age: 49 y.o., MRN: [id number], Sex: female    Diagnosis  Pre-op Diagnosis      * Left eye affected by proliferative diabetic retinopathy with traction retinal detachment involving macula, associated with type 2 diabetes mellitus [E11.3522] Post-op Diagnosis     * Left eye affected by proliferative diabetic retinopathy with traction retinal detachment involving macula, associated with type 2 diabetes mellitus [E11.3522]     * Vitreous hemorrhage of left eye (Multi) [H43.12]     Procedures  Vitrectomy, membrane peel, endolaser, lensectomy, 14% C3F8 gas  54384 - [place] RPR COMPLEX RETINA DETACH VITRECT &MEMBRANE PEEL      Surgeons      * Amy Read - Primary    Resident/Fellow/Other Assistant:  Surgeons and Role:     * Adalberto Rizvi MD - Assisting    Staff:   Circulator: Lizeth  Circulator: Sheila  Circulator: Master Hilliardub Person: Nadira  Scrub Person: Adalberto    Anesthesia Staff: Anesthesiologist: Julián Centeno DO  CRNA: DEVYN Babcock-DOLORES    Procedure Summary  Anesthesia: Anesthesia type not filed in the log.  ASA: ASA status not filed in the log.  Estimated Blood Loss: 0 mL  Intra-op Medications:   Administrations occurring from 0923 to 1123 on 25:   Medication Name Total Dose   bupivacaine PF (Marcaine) injection 0.75 % 3.5 mL   balanced salts (BSS) intraocular solution 15 mL   balanced salts (BSS Plus) intraocular solution 500 mL   sterile water irrigation solution 100 mL   ceFAZolin (Ancef) injection 50 mg   dexAMETHasone (Decadron) injection 10 mg   tobramycin-dexamethasone (Tobradex) ophthalmic ointment 0.5 inch   povidone-iodine 5 % ophthalmic solution 1 Application   triamcinolone acetonide (Kenalog-40) injection 40 mg   chondroitin sulf-sod hyaluron (Viscoat) intraocular injection 0.5 mL   lidocaine (Xylocaine) 20 mg/mL (2 %) injection 3.5 mL   phenylephrine (Mydfrin) 2.5 % ophthalmic solution 1 drop 2 drop   tropicamide  (Mydriacyl) 1 % ophthalmic solution 1 drop 2 drop   fentaNYL (Sublimaze) injection 50 mcg/mL 100 mcg   midazolam (Versed) injection 1 mg/mL 2 mg   propofol (Diprivan) injection 10 mg/mL 40 mg              Anesthesia Record               Intraprocedure I/O Totals       None           Specimen: No specimens collected     Findings: vitreous hemorrhage, proliferative diabetic retinopathy with tractional retinal detachment involving the macula - left eye    Complications:  None; patient tolerated the procedure well.     Disposition: PACU - hemodynamically stable.  Condition: stable  Specimens Collected: No specimens collected    Attending Attestation:     A qualified resident physician was not available and the use of a skilled assistant surgeon, Adalberto Rizvi MD, was required for the following portions of this case: 25 gauge pars plana vitrectomy, membrane peel, lensectomy, endodrainage, air-fluid exchange, endolaser, 14% C3F8 gas tamponade - left eye      mAy Read  Phone Number: [phone]

## 2025-04-28 NOTE — H&P
"History Of Present Illness  Pebbles Concepcion is a 49 y.o. female presenting with left eye proliferative diabetic retinopathy and macula off tractional retinal detachment.     Past Medical History  She has a past medical history of Diabetes mellitus (Multi), Encounter for general adult medical examination without abnormal findings (2019), Graves disease, Personal history of other diseases of the digestive system (2017), RPE (retinal pigment epithelium) atrophy, TRD (traction retinal detachment), left, and Type 2 diabetes mellitus.    Surgical History  She has a past surgical history that includes Other surgical history; Pars plana vitrectomy (Right, ); and  section, low transverse.     Social History  She reports that she has never smoked. She has never used smokeless tobacco. She reports that she does not drink alcohol and does not use drugs.     Allergies  Patient has no known allergies.    Meds  Current Outpatient Medications   Medication Instructions    blood sugar diagnostic (OneTouch Verio test strips) strip TEST 3 TIMES DAILY.    blood-glucose meter misc Check BG 2-3 times a day  Substitute with any glucometer/strips/lancets covered by insurance    empagliflozin (JARDIANCE) 25 mg, oral, Daily    estradiol (ESTRACE) 1 mg, oral, Daily    lancets (OneTouch UltraSoft Lancets) [facility] [place] [facility] Check BG 2-3 times daily  Please provide patient with glucometer/ strips/ lancets covered by insurance    lancets misc miscellaneous    loratadine (CLARITIN) 10 mg    metFORMIN (GLUCOPHAGE) 1,000 mg, oral, 2 times daily    methIMAzole (TAPAZOLE) 10 mg, oral, 2 times daily    OneTouch Ultra Test strip Check BG 2-3 times daily  Please provide patient with glucometer/ strips/ lancets covered by insurance    Ozempic 1 mg, subcutaneous, Once Weekly    pen needle, diabetic 32 gauge x \" needle 1 each, miscellaneous, Once Weekly, To go with ozempic pen    progesterone (Prometrium) 100 mg capsule 1 tablet for 14 days " every 3 months    progesterone (PROMETRIUM) 200 mg, oral, Nightly, Take 1 tablet daily for 14 days every 3 months    semaglutide (Ozempic) 0.25 mg or 0.5 mg(2 mg/1.5 mL) pen injector Inject 0.25 mg under the skin 1 (one) time per week for 30 days, THEN 0.5 mg 1 (one) time per week. Start Ozempic 0.25 mg once weekly injection week 1 through week 4 if tolerated increase to 0.5 mg weekly during week 5 to week 8, if tolerated increase to 1mg week 9 and continue with 1 mg until seen.         ROS  Patient denies ocular pain, redness, discharge, decreased vision, double vision, blind spots, flashes, or floaters.     Physical Exam  Not recorded     Constitutional: No acute distress   HEENT: mucus membranes moist, atraumatic   Respiratory: no respiratory distress   Cardiovascular: no peripheral edema  Abdomen: non distended   MSK/neuro: moves all extremities spontaneously   Skin: no rashes   Psych: alert and oriented        Assessment/Plan   Assessment & Plan  Diabetic retinopathy (Multi)      Plan left eye pars plana vitrectomy (PPV) and retinal detachment repair with endolaser, membrane peel, possible gas or silicone oil tamponade            Sb Chakraborty MD

## 2025-04-28 NOTE — OP NOTE
Vitrectomy, membrane peel, endolaser, lensectomy, 14% C3F8 gas (L) Operative Note     Date: 2025  [place] [facility]    Name: Pebbles Concepcion, : 1975, Age: 49 y.o., MRN: [id number], Sex: female    Diagnosis  Pre-op Diagnosis      * Left eye affected by proliferative diabetic retinopathy with traction retinal detachment involving macula, associated with type 2 diabetes mellitus [E11.3522] Post-op Diagnosis     * Left eye affected by proliferative diabetic retinopathy with traction retinal detachment involving macula, associated with type 2 diabetes mellitus [E11.3522]     * Vitreous hemorrhage of left eye (Multi) [H43.12]     Procedures  Vitrectomy, membrane peel, endolaser, lensectomy, 14% C3F8 gas  76947 - [place] RPR COMPLEX RETINA DETACH VITRECT &MEMBRANE PEEL    Surgeons      * Amy Read - Primary    Resident/Fellow/Other Assistant:  Surgeons and Role:     * Adalberto Rizvi MD - Assisting    Staff:   Circulator: Lizeth  Circulator: Sheila  Circulator: Master  Scrub Person: Nadira Hilliardub Person: Adalberto    Anesthesia Staff: Anesthesiologist: Julián Centeno DO  CRNA: DEVYN Babcock-CRNA    Procedure Summary  Anesthesia: Anesthesia type not filed in the log.  ASA: ASA status not filed in the log.  Estimated Blood Loss: 0 mL  Intra-op Medications:   Administrations occurring from 0923 to 1123 on 25:   Medication Name Total Dose   bupivacaine PF (Marcaine) injection 0.75 % 3.5 mL   balanced salts (BSS) intraocular solution 15 mL   balanced salts (BSS Plus) intraocular solution 500 mL   sterile water irrigation solution 100 mL   ceFAZolin (Ancef) injection 50 mg   dexAMETHasone (Decadron) injection 10 mg   tobramycin-dexamethasone (Tobradex) ophthalmic ointment 0.5 inch   povidone-iodine 5 % ophthalmic solution 1 Application   triamcinolone acetonide (Kenalog-40) injection 40 mg   chondroitin sulf-sod hyaluron (Viscoat) intraocular injection 0.5 mL   lidocaine (Xylocaine) 20 mg/mL (2 %) injection  3.5 mL   phenylephrine (Mydfrin) 2.5 % ophthalmic solution 1 drop 2 drop   tropicamide (Mydriacyl) 1 % ophthalmic solution 1 drop 2 drop   fentaNYL (Sublimaze) injection 50 mcg/mL 100 mcg   midazolam (Versed) injection 1 mg/mL 2 mg   propofol (Diprivan) injection 10 mg/mL 40 mg          Anesthesia Record               Intraprocedure I/O Totals       None           Specimen: No specimens collected   Drains and/or Catheters: * None in log *    Tourniquet Times: n/a        Implants:  Implants       Type Name Action Serial No.       CYLINDER, C3F8 GAS - [id number] Implanted               Findings: vitreous hemorrhage, proliferative diabetic retinopathy with tractional retinal detachment involving the macula - left eye    Indications: Pebbles Concepcion is an 49 y.o. female who is having surgery for Left eye affected by proliferative diabetic retinopathy with traction retinal detachment involving macula, associated with type 2 diabetes mellitus (Multi) [E11.3522].     The patient was seen in the preoperative area. The risks, benefits, complications, treatment options, non-operative alternatives, expected recovery and outcomes were discussed with the patient. The possibilities of reaction to medication, pulmonary aspiration, injury to surrounding structures, bleeding, recurrent infection, the need for additional procedures, failure to diagnose a condition, and creating a complication requiring transfusion or operation were discussed with the patient. The patient concurred with the proposed plan, giving informed consent.  The site of surgery was properly noted/marked if necessary per policy. The patient has been actively warmed in preoperative area. Preoperative antibiotics are not indicated. Venous thrombosis prophylaxis are not indicated.    Procedure Details:     DATE OF SURGERY: 4/28/2025    SURGEON: Amy Read MD    ASSISTANT: Adalberto Rizvi MD    PREOPERATIVE DIAGNOSIS  Pre-Op Diagnosis Codes:      * Left eye  affected by proliferative diabetic retinopathy with traction retinal detachment involving macula, associated with type 2 diabetes mellitus [E11.3522]    POSTOPERATIVE DIAGNOSIS   Post-op Diagnosis     * Left eye affected by proliferative diabetic retinopathy with traction retinal detachment involving macula, associated with type 2 diabetes mellitus [E11.3522]     * Vitreous hemorrhage of left eye (Multi) [H43.12]    OPERATION PERFORMED  Repair of complex retinal detachment with:  25-gauge pars plana vitrectomy, membrane peel, lensectomy, endodrainage, fluid-air exchange, endolaser and injection of 14% C3F8 gas to the left eye    ANESTHESIA  Monitored anesthesia care with a standard block consisting of a 1:1 mixture of 4 %  lidocaine and 0.75% Marcaine with Wydase     FINDINGS  As detailed below     COMPLICATIONS  None     ESTIMATED BLOOD LOSS   Minimal     SPECIMENS REMOVED  None     JUSTIFICATION  Ms. Concepcion is a 49 y.o. female with  vitreous hemorrhage, proliferative diabetic retinopathy with tractional retinal detachment involving the macula - left eye. This was causing decreased visual function. The risks, benefits, and alternatives to the procedure were discussed with the patient including the risk for vision loss, blindness, retinal detachment, infection, bleeding, pain, high or low pressure in the eye, double vision, no benefit, need for additional procedures, and droopy eyelid, amongst others. The patient had a full opportunity to have all questions answered in clinic prior to surgery. Afterwards, the patient requested that we perform surgery and signed the consent form.     PROCEDURE:   The patient was brought to the preoperative holding area where the correct eye was confirmed and marked. The patient then underwent intravenous sedation by the anesthesia team followed by a block as described above. The patient was then brought to the operating room where a secondary time-out was performed to identify the  correct patient, eye, procedure, and any allergies. The eye was prepped and draped in the usual sterile ophthalmic fashion followed by placement of a lid speculum.     A 25-gauge trocar was placed in the inferotemporal quadrant 4 mm posterior to the limbus after conjunctival displacement.  A 4 mm infusion cannula was placed through this trocar, and the infusion cannula was confirmed in the vitreous cavity prior to turning it on. Two additional 25-gauge trocars were placed in a similar fashion in the superonasal and superotemporal quadrants 4  mm posterior to the limbus after conjunctival displacement. A fourth trocar was placed 4mm posterior to the limbus within the inferonasal quadrant and a chandelier endoilluminator was placed to improve visualization and facilitate planned bimanual dissection techniques.      At this time, a standard three-port pars plana vitrectomy was performed using the light pipe, the cutter, and the BIOM viewing system. A core vitreous dissection was performed and vitreous hemorrhage was evacuated. The retina was inspected and there was evidence of advanced proliferative diabetic retinopathy with tractional retinal detachment involving the macula; the entire posterior pole and macula was detached with dense fibrovacular membranes covering the optic nerve, arcades and nasal portion of the macula. There was subretinal fluid extending into the midperiphery from the chronic tractional retinal detachment. The patient had prior sparse panretinal photocoagulation laser treatment in the midperiphery performed at an outside facility, however there were large areas of ischemic retina that had not been previously treated. A partial posterior vitreous detachment was noted with traction at the posterior fibrovascular membranes.  The re-posterior vitreous traction was released for 360 degrees, isolating the posterior fibrovascular membranes. A thorough peripheral vitreous dissection was performed.   Attention was then drawn to the fibrovascular membranes which were peeled and removed from the underlying retina using a combination of the vitrector, serrated forceps. During membrane dissection, the patient's cataractous lens became increasing opacified which limited our view to the posterior segment. The decision was made to perform a lensectomy; the crystalline lens was dissected and evacuated from the posterior segment with the vitrector. Care was taken to leave the anterior capsule intact to facilitate future intraocular lens placement. All nuclear and cortical lens fragments were removed successfully without complication. With an improved view, Bimanual segmentation and delamination techniques with the curved scissor and maxgrip forceps were then employed to remove the remaining adherent preretinal fibrovascular tissue.  The preretinal membranes were completely removed off of the optic nerve and arcades and the tractional forces on the retina appeared to be improved. During membrane dissection, a single small retinal break was created within the nasal peripapillary retina; the break was marked Intravitreal diathermy and hemostasis was achieved.    A complete fluid-air exchange was performed draining all subretinal fluid through the nasal peripapillary break until the retina was noted to flatten.  Residual fluid was removed over the optic nerve using the soft tip cannula.  The endolaser probe was brought onto the field and was used to apply laser to 2-3 rows of confluent laser circumferentially around the nasal break, and then the laser was applied from the arcades to the ora ernesto in panretinal photocoagulation (PRP) fashion to all areas of ischemic retina that had not been previously treated. Laser Settings: Power - 250-300 mW, Duration: 100 ms, Interval: 150ms, Shot Count: [id number]. Total Energy: 33.02 Joules.     The superonasal trocar was removed and the sclerotomy was  air-tight. The inferonasal trocar and  chandelier were removed and the sclerotomy was air-tight.  An exchange of 14%C3F8 gas was then performed through the infusion cannula and vented through the superotemporal trocar. The superotemporal trocar was removed and the sclerotomy was  gas-tight.  The infusion cannula and associated trocar were removed and the sclerotomy was  gas-tight.  The eye was confirmed to be at a physiologic level by digital palpation.    Subconjunctival injection of Cefazolin and Dexamethasone were administered. The lid speculum and drapes were removed. Antibiotic ointment and atropine drops were applied. The eye was patched and shielded. The patient tolerated the procedure well without any intraoperative or immediate postoperative complications. The patient was taken to the recovery room in good condition. The patient was instructed to maintain a strict face-down position. The patient will follow up with Amy Read MD in clinic on the following morning.      Evidence of Infection: No   Complications:  None; patient tolerated the procedure well.    Disposition: PACU - hemodynamically stable.  Condition: stable     Additional Details:     A qualified resident physician was not available and the use of a skilled assistant surgeon, Adalberto Rizvi MD, was required for the following portions of this case: 25 gauge pars plana vitrectomy, membrane peel, lensectomy, endodrainage, air-fluid exchange, endolaser, 14% C3F8 gas tamponade - left eye    Attending Attestation:     Amy Read  Phone Number: [phone]

## 2025-04-28 NOTE — DISCHARGE INSTRUCTIONS
Please keep the eye patched/shielded until your post op day 1 appointment tomorrow.    Appointment: Pending sale to [facility], Amy Read MD - please report to clinic at 10:30AM as scheduled     Please do not perform any strenuous activity, bending below the waist, until you are cleared by your doctor.     Post Operative Medications: You will start your eye drops tomorrow after your post operative day 1 appointment   Antibiotic (Tan Cap) - 4 times per day              Steroid (Pink Cap) - 4 times per day              Dilating Drops) () - 2x/day (morning, evening)   The order of drop instillation does not matter but you must wait atleast 5 minutes in between drops to ensure that the medications absorb properly    You have gas in the the eye after surgery. You are not permitted to fly in airplanes or experience any significant change in elevation until your are cleared by your doctor. You must wear your gas bracelet until your are cleared by your doctor.     Positioning: Please maintain strict face down positioning during the day for the next 3 days. You may take one 10-15 break per hour to sit upright/stand to eat and use the bathroom. When sitting or standing upright, look down at the floor to maintain facedown positioning.  At night, lay on your right side with  your face down and into the pillow. DO NOT LIE FLAT ON YOUR BACK at any time while you have gas in the eye.

## 2025-04-29 ENCOUNTER — APPOINTMENT (OUTPATIENT)
Dept: OPHTHALMOLOGY | Facility: CLINIC | Age: 50
End: 2025-04-29
Payer: COMMERCIAL

## 2025-04-29 DIAGNOSIS — H33.22: ICD-10-CM

## 2025-04-29 DIAGNOSIS — B02.30 HERPES ZOSTER OPHTHALMICUS OF LEFT EYE: ICD-10-CM

## 2025-04-29 DIAGNOSIS — B02.30 HERPES ZOSTER OPHTHALMICUS OF LEFT EYE: Primary | ICD-10-CM

## 2025-04-29 PROBLEM — B00.52: Status: ACTIVE | Noted: 2025-04-29

## 2025-04-29 PROBLEM — B01.81: Status: ACTIVE | Noted: 2025-04-29

## 2025-04-29 PROCEDURE — 99024 POSTOP FOLLOW-UP VISIT: CPT

## 2025-04-29 RX ORDER — VALACYCLOVIR HYDROCHLORIDE 1 G/1
1000 TABLET, FILM COATED ORAL 3 TIMES DAILY
Qty: 7 TABLET | Refills: 0 | Status: SHIPPED | OUTPATIENT
Start: 2025-04-29 | End: 2025-04-29 | Stop reason: SDUPTHER

## 2025-04-29 RX ORDER — ERYTHROMYCIN 5 MG/G
OINTMENT OPHTHALMIC 2 TIMES DAILY
Qty: 3.5 G | Refills: 0 | Status: SHIPPED | OUTPATIENT
Start: 2025-04-29 | End: 2025-05-13

## 2025-04-29 RX ORDER — VALACYCLOVIR HYDROCHLORIDE 1 G/1
1000 TABLET, FILM COATED ORAL 3 TIMES DAILY
Qty: 21 TABLET | Refills: 0 | Status: SHIPPED | OUTPATIENT
Start: 2025-04-29 | End: 2025-05-06

## 2025-04-29 ASSESSMENT — ENCOUNTER SYMPTOMS
GASTROINTESTINAL NEGATIVE: 0
NEUROLOGICAL NEGATIVE: 0
CONSTITUTIONAL NEGATIVE: 0
CARDIOVASCULAR NEGATIVE: 0
HEMATOLOGIC/LYMPHATIC NEGATIVE: 0
ALLERGIC/IMMUNOLOGIC NEGATIVE: 0
RESPIRATORY NEGATIVE: 0
EYES NEGATIVE: 1
ENDOCRINE NEGATIVE: 0
MUSCULOSKELETAL NEGATIVE: 0
PSYCHIATRIC NEGATIVE: 0

## 2025-04-29 ASSESSMENT — CUP TO DISC RATIO: OD_RATIO: .3

## 2025-04-29 ASSESSMENT — SLIT LAMP EXAM - LIDS
COMMENTS: GOOD POSITION
COMMENTS: GOOD POSITION

## 2025-04-29 ASSESSMENT — TONOMETRY
IOP_METHOD: APPLANATION
OS_IOP_MMHG: 14

## 2025-04-29 ASSESSMENT — EXTERNAL EXAM - RIGHT EYE: OD_EXAM: NORMAL

## 2025-04-29 ASSESSMENT — VISUAL ACUITY: METHOD: SNELLEN - LINEAR

## 2025-04-29 NOTE — PROGRESS NOTES
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     #Plan#:   - OS Mac off TRD; long standing (over 10 years)  -s/p PPV/MP/EL/endolaser/lensectomy/gas OS 04/29/25   -previously discussed guarded visual prognosis    Today 04/29/25 POD1. Patient in significant amount of pain but IOP wnl. Some corneal changes, ddx healing nasal epithelial defect (noted intra-op) vs possible herpetic infection. Pt with chicken pox as a child, has not gotten shingles vaccine, denies cold sore hx  -hold pred QID for now, start erythromycin carolina and frequent PFAT's   - start valtrex 1g TID x 7 days   -f/u 2 days for K check     Proliferative diabetic retinopathy without macular edema in type II DM, right eyeE11.3591  S/p PPV EL (12 years ago)    - OCT 10/01/24   --- OD: Normal foveal countor, retinal thickness and retinal laminations.     #Plan#:   - Emphasized the importance of blood glucose, BP, and cholestrol level control  - Discussed risks/benefits/alteranatives of treatment options.   - Observe  - RTC in 3 months      Follow up  Retina 2 days     Orx:  Moxi QID OS  Erythromycin carolina bid OS  Atropine BID OS   Valtrex 1g TID x 1 week   HOLD pred QID OS

## 2025-04-29 NOTE — LETTER
April 29, 2025     Patient: Pebbles Concepcion   YOB: 1975   Date of Visit: 4/29/2025       To Whom It May Concern:    It is my medical opinion that Pebbles Concepcion should remain out of work until 5/13/2025 .    If you have any questions or concerns, please don't hesitate to call.         Sincerely,        Amy Read MD PhD    CC: No Recipients

## 2025-05-01 ENCOUNTER — OFFICE VISIT (OUTPATIENT)
Dept: OPHTHALMOLOGY | Facility: CLINIC | Age: 50
End: 2025-05-01
Payer: COMMERCIAL

## 2025-05-01 DIAGNOSIS — H33.22: Primary | ICD-10-CM

## 2025-05-01 PROCEDURE — 99024 POSTOP FOLLOW-UP VISIT: CPT

## 2025-05-01 ASSESSMENT — ENCOUNTER SYMPTOMS
HEMATOLOGIC/LYMPHATIC NEGATIVE: 0
CARDIOVASCULAR NEGATIVE: 0
CONSTITUTIONAL NEGATIVE: 0
PSYCHIATRIC NEGATIVE: 0
NEUROLOGICAL NEGATIVE: 0
EYES NEGATIVE: 1
GASTROINTESTINAL NEGATIVE: 0
ALLERGIC/IMMUNOLOGIC NEGATIVE: 0
MUSCULOSKELETAL NEGATIVE: 0
ENDOCRINE NEGATIVE: 0
RESPIRATORY NEGATIVE: 0

## 2025-05-01 ASSESSMENT — CONF VISUAL FIELD
OS_NORMAL: 1
OD_SUPERIOR_NASAL_RESTRICTION: 0
OS_SUPERIOR_NASAL_RESTRICTION: 0
OS_INFERIOR_NASAL_RESTRICTION: 0
OD_INFERIOR_NASAL_RESTRICTION: 0
OD_SUPERIOR_TEMPORAL_RESTRICTION: 0
OS_SUPERIOR_TEMPORAL_RESTRICTION: 0
OD_NORMAL: 1
OD_INFERIOR_TEMPORAL_RESTRICTION: 0
OS_INFERIOR_TEMPORAL_RESTRICTION: 0

## 2025-05-01 ASSESSMENT — SLIT LAMP EXAM - LIDS
COMMENTS: GOOD POSITION
COMMENTS: GOOD POSITION

## 2025-05-01 ASSESSMENT — TONOMETRY
OS_IOP_MMHG: 21
IOP_METHOD: TONOPEN

## 2025-05-01 ASSESSMENT — VISUAL ACUITY
OS_CC: LP
METHOD: SNELLEN - LINEAR
CORRECTION_TYPE: GLASSES

## 2025-05-01 ASSESSMENT — CUP TO DISC RATIO: OD_RATIO: .3

## 2025-05-01 ASSESSMENT — EXTERNAL EXAM - RIGHT EYE: OD_EXAM: NORMAL

## 2025-05-01 NOTE — PROGRESS NOTES
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     #Plan#:   - OS Mac off TRD; long standing (over 10 years)  -s/p PPV/MP/EL/endolaser/lensectomy/gas OS 04/29/25   -previously discussed guarded visual prognosis    Today 05/01/25 POD1. Patient in significant amount of pain but IOP wnl. Some corneal changes, ddx healing nasal epithelial defect (noted intra-op) vs possible herpetic infection. Pt with chicken pox as a child, has not gotten shingles vaccine, denies cold sore hx  -hold pred QID for now, start erythromycin carolina and frequent PFAT's   - start valtrex 1g TID x 7 days   -f/u 2 days for K check     Proliferative diabetic retinopathy without macular edema in type II DM, right eyeE11.3591  S/p PPV EL (12 years ago)    - OCT 10/01/24   --- OD: Normal foveal countor, retinal thickness and retinal laminations.     #Plan#:   - Emphasized the importance of blood glucose, BP, and cholestrol level control  - Discussed risks/benefits/alteranatives of treatment options.   - Observe  - RTC in 3 months      Follow up  Retina 2 days     Orx:  Moxi QID OS  Erythromycin carolina bid OS  Atropine BID OS   Valtrex 1g TID x 1 week   HOLD pred QID OS     -

## 2025-05-07 ENCOUNTER — APPOINTMENT (OUTPATIENT)
Dept: OPHTHALMOLOGY | Facility: CLINIC | Age: 50
End: 2025-05-07
Payer: COMMERCIAL

## 2025-05-07 DIAGNOSIS — E11.3521 RIGHT EYE AFFECTED BY PROLIFERATIVE DIABETIC RETINOPATHY WITH TRACTION RETINAL DETACHMENT INVOLVING MACULA, ASSOCIATED WITH TYPE 2 DIABETES MELLITUS: ICD-10-CM

## 2025-05-07 DIAGNOSIS — E11.3592 PROLIFERATIVE DIABETIC RETINOPATHY OF LEFT EYE WITHOUT MACULAR EDEMA ASSOCIATED WITH TYPE 2 DIABETES MELLITUS: ICD-10-CM

## 2025-05-07 DIAGNOSIS — H33.22: ICD-10-CM

## 2025-05-07 DIAGNOSIS — B02.30 HERPES ZOSTER OPHTHALMICUS OF LEFT EYE: Primary | ICD-10-CM

## 2025-05-07 PROCEDURE — 99024 POSTOP FOLLOW-UP VISIT: CPT

## 2025-05-07 RX ORDER — VALACYCLOVIR HYDROCHLORIDE 1 G/1
1000 TABLET, FILM COATED ORAL 3 TIMES DAILY
Qty: 30 TABLET | Refills: 0 | Status: SHIPPED | OUTPATIENT
Start: 2025-05-07 | End: 2025-05-17

## 2025-05-07 ASSESSMENT — ENCOUNTER SYMPTOMS
ENDOCRINE NEGATIVE: 0
PSYCHIATRIC NEGATIVE: 0
NEUROLOGICAL NEGATIVE: 0
GASTROINTESTINAL NEGATIVE: 0
CARDIOVASCULAR NEGATIVE: 0
HEMATOLOGIC/LYMPHATIC NEGATIVE: 0
EYES NEGATIVE: 0
CONSTITUTIONAL NEGATIVE: 0
MUSCULOSKELETAL NEGATIVE: 0
RESPIRATORY NEGATIVE: 0
ALLERGIC/IMMUNOLOGIC NEGATIVE: 0

## 2025-05-07 ASSESSMENT — VISUAL ACUITY
CORRECTION_TYPE: GLASSES
OD_PH_CC+: -1
METHOD: SNELLEN - LINEAR
OS_CC: LP
OD_CC: 20/60
OD_PH_CC: 20/50

## 2025-05-07 ASSESSMENT — TONOMETRY
OD_IOP_MMHG: 14
IOP_METHOD: GOLDMANN APPLANATION
OS_IOP_MMHG: 17

## 2025-05-07 ASSESSMENT — SLIT LAMP EXAM - LIDS
COMMENTS: GOOD POSITION
COMMENTS: GOOD POSITION

## 2025-05-07 ASSESSMENT — EXTERNAL EXAM - RIGHT EYE: OD_EXAM: NORMAL

## 2025-05-07 ASSESSMENT — CUP TO DISC RATIO: OS_RATIO: 0.3

## 2025-05-07 NOTE — PROGRESS NOTES
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     #Plan#:   - OS Mac off TRD; long standing (over 10 years)  -s/p PPV/MP/EL/endolaser/lensectomy/C3F8 gas (14%) OS 04/29/25   -previously discussed guarded visual prognosis    Today 05/07/25 POW1. Pain completely improved. Corneal changes healed. Pt with chicken pox as a child, has not gotten shingles vaccine, denies cold sore hx  - erythromycin carolina and frequent PFAT's   - Valtrex 1g TID x 7 days completed, still with minor corneal changes, sent 10 day more supply of Valtrex  - Continue PF QID   - RTC 1 week       Proliferative diabetic retinopathy without macular edema in type II DM, right eyeE11.3592  S/p PPV EL (12 years ago)    - OCT 10/01/24   --- OD: Normal foveal countor, retinal thickness and retinal laminations.     #Plan#:   - Emphasized the importance of blood glucose, BP, and cholestrol level control  - Discussed risks/benefits/alteranatives of treatment options.   - Observe  - RTC in 3 months        Orx:  Moxi QID OS  Erythromycin carolina bid OS  Atropine BID OS   Valtrex 1g TID x 1 week   Pred QID OS     -

## 2025-05-12 DIAGNOSIS — E05.00 GRAVES DISEASE: Primary | ICD-10-CM

## 2025-05-16 ENCOUNTER — APPOINTMENT (OUTPATIENT)
Dept: OPHTHALMOLOGY | Facility: CLINIC | Age: 50
End: 2025-05-16
Payer: COMMERCIAL

## 2025-05-16 LAB
ALBUMIN SERPL-MCNC: 4.2 G/DL (ref 3.6–5.1)
ALP SERPL-CCNC: 87 U/L (ref 37–153)
ALT SERPL-CCNC: 10 U/L (ref 6–29)
ANION GAP SERPL CALCULATED.4IONS-SCNC: 13 MMOL/L (CALC) (ref 7–17)
AST SERPL-CCNC: 15 U/L (ref 10–35)
BILIRUB SERPL-MCNC: 0.3 MG/DL (ref 0.2–1.2)
BUN SERPL-MCNC: 24 MG/DL (ref 7–25)
CALCIUM SERPL-MCNC: 9.4 MG/DL (ref 8.6–10.4)
CHLORIDE SERPL-SCNC: 101 MMOL/L (ref 98–110)
CO2 SERPL-SCNC: 24 MMOL/L (ref 20–32)
CREAT SERPL-MCNC: 0.62 MG/DL (ref 0.5–1.03)
EGFRCR SERPLBLD CKD-EPI 2021: 108 ML/MIN/1.73M2
GLUCOSE SERPL-MCNC: 125 MG/DL (ref 65–99)
POTASSIUM SERPL-SCNC: 4.1 MMOL/L (ref 3.5–5.3)
PROT SERPL-MCNC: 7.2 G/DL (ref 6.1–8.1)
SODIUM SERPL-SCNC: 138 MMOL/L (ref 135–146)
T3 SERPL-MCNC: 114 NG/DL (ref 76–181)
T4 FREE SERPL-MCNC: 1.4 NG/DL (ref 0.8–1.8)
TSH SERPL-ACNC: 0.19 MIU/L
TSI SER-ACNC: 372 % BASELINE

## 2025-05-21 ENCOUNTER — APPOINTMENT (OUTPATIENT)
Dept: OPHTHALMOLOGY | Facility: CLINIC | Age: 50
End: 2025-05-21
Payer: COMMERCIAL

## 2025-05-21 DIAGNOSIS — H33.22: ICD-10-CM

## 2025-05-21 DIAGNOSIS — E11.3521 RIGHT EYE AFFECTED BY PROLIFERATIVE DIABETIC RETINOPATHY WITH TRACTION RETINAL DETACHMENT INVOLVING MACULA, ASSOCIATED WITH TYPE 2 DIABETES MELLITUS: Primary | ICD-10-CM

## 2025-05-21 DIAGNOSIS — E11.3592 PROLIFERATIVE DIABETIC RETINOPATHY OF LEFT EYE WITHOUT MACULAR EDEMA ASSOCIATED WITH TYPE 2 DIABETES MELLITUS: ICD-10-CM

## 2025-05-21 DIAGNOSIS — B02.30 HERPES ZOSTER OPHTHALMICUS OF LEFT EYE: ICD-10-CM

## 2025-05-21 PROCEDURE — 92250 FUNDUS PHOTOGRAPHY W/I&R: CPT | Mod: LEFT SIDE

## 2025-05-21 PROCEDURE — 99024 POSTOP FOLLOW-UP VISIT: CPT

## 2025-05-21 ASSESSMENT — SLIT LAMP EXAM - LIDS
COMMENTS: GOOD POSITION
COMMENTS: GOOD POSITION

## 2025-05-21 ASSESSMENT — VISUAL ACUITY
OD_CC: 20/30
OS_CC: LP
CORRECTION_TYPE: GLASSES
METHOD: SNELLEN - LINEAR

## 2025-05-21 ASSESSMENT — TONOMETRY
OS_IOP_MMHG: 21
OD_IOP_MMHG: 20
IOP_METHOD: GOLDMANN APPLANATION

## 2025-05-21 ASSESSMENT — CUP TO DISC RATIO: OS_RATIO: 0.3

## 2025-05-21 ASSESSMENT — EXTERNAL EXAM - RIGHT EYE: OD_EXAM: NORMAL

## 2025-05-21 NOTE — PROGRESS NOTES
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     #Plan#:   - OS Mac off TRD; long standing (over 10 years)  -s/p PPV/MP/EL/endolaser/lensectomy/C3F8 gas (14%) OS 04/29/25   -previously discussed guarded visual prognosis    Today 05/21/25 POW3. Pain completely improved. Corneal changes healed. Pt with chicken pox as a child, has not gotten shingles vaccine, denies cold sore hx  - Stop Valtrex 1g TID now completed   - Taper PF to BID x 1 week then daily x 1 week then STOP   - RTC 3 weeks       Proliferative diabetic retinopathy without macular edema in type II DM, right [id number]  S/p PPV EL (12 years ago)    - OCT 10/01/24   --- OD: Normal foveal countor, retinal thickness and retinal laminations.     #Plan#:   - Emphasized the importance of blood glucose, BP, and cholestrol level control  - Discussed risks/benefits/alteranatives of treatment options.   - Observe  - RTC in 3 months      Orx:  STOP Moxi QID OS   STOP Erythromycin carolina bid OS  STOP Valtrex   Atropine BID OS to at bedtime OS   Pred QID OS to BID/1 week then daily x 1 week     -

## 2025-05-28 DIAGNOSIS — Z00.00 HEALTH CARE MAINTENANCE: ICD-10-CM

## 2025-05-28 RX ORDER — METFORMIN HYDROCHLORIDE 500 MG/1
1000 TABLET ORAL 2 TIMES DAILY
Qty: 120 TABLET | Refills: 5 | Status: SHIPPED | OUTPATIENT
Start: 2025-05-28

## 2025-06-09 ENCOUNTER — TELEPHONE (OUTPATIENT)
Dept: OBSTETRICS AND GYNECOLOGY | Facility: CLINIC | Age: 50
End: 2025-06-09
Payer: COMMERCIAL

## 2025-06-09 DIAGNOSIS — Z79.890 HORMONE REPLACEMENT THERAPY (POSTMENOPAUSAL): Primary | ICD-10-CM

## 2025-06-09 RX ORDER — PROGESTERONE 200 MG/1
200 CAPSULE ORAL NIGHTLY
Qty: 14 CAPSULE | Refills: 3 | Status: SHIPPED | OUTPATIENT
Start: 2025-06-09 | End: 2025-06-23

## 2025-06-09 NOTE — TELEPHONE ENCOUNTER
Patient has been taking 100 mg 14 days every 3 months of the progesterone.    Discussed with patient and will increase to 200 mg of progesterone 14 days every 3 months.    Prescription called in

## 2025-06-16 ENCOUNTER — APPOINTMENT (OUTPATIENT)
Dept: OPHTHALMOLOGY | Facility: CLINIC | Age: 50
End: 2025-06-16
Payer: COMMERCIAL

## 2025-06-16 DIAGNOSIS — H27.02 APHAKIA OF LEFT EYE: Primary | ICD-10-CM

## 2025-06-16 DIAGNOSIS — E11.3593 PROLIFERATIVE DIABETIC RETINOPATHY OF BOTH EYES ASSOCIATED WITH TYPE 2 DIABETES MELLITUS, UNSPECIFIED PROLIFERATIVE RETINOPATHY TYPE: ICD-10-CM

## 2025-06-16 PROCEDURE — 99024 POSTOP FOLLOW-UP VISIT: CPT

## 2025-06-16 PROCEDURE — 92250 FUNDUS PHOTOGRAPHY W/I&R: CPT

## 2025-06-16 PROCEDURE — 92136 OPHTHALMIC BIOMETRY: CPT | Mod: BILATERAL PROCEDURE

## 2025-06-16 ASSESSMENT — REFRACTION_WEARINGRX
OS_SPHERE: BALANCE
OD_CYLINDER: +0.75
OS_ADD: +1.75
OD_AXIS: 030
OD_SPHERE: -4.00
OD_ADD: +1.75

## 2025-06-16 ASSESSMENT — VISUAL ACUITY
METHOD: SNELLEN - LINEAR
CORRECTION_TYPE: GLASSES
OD_CC: 20/30

## 2025-06-16 ASSESSMENT — TONOMETRY
OD_IOP_MMHG: DEFER
OS_IOP_MMHG: 14
IOP_METHOD: GOLDMANN APPLANATION

## 2025-06-16 ASSESSMENT — CUP TO DISC RATIO: OS_RATIO: 0.3

## 2025-06-16 ASSESSMENT — SLIT LAMP EXAM - LIDS
COMMENTS: GOOD POSITION
COMMENTS: GOOD POSITION

## 2025-06-16 ASSESSMENT — EXTERNAL EXAM - RIGHT EYE: OD_EXAM: NORMAL

## 2025-06-16 NOTE — PROGRESS NOTES
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     #Plan#:   - OS Mac off TRD; long standing (over 10 years)  -s/p PPV/MP/EL/endolaser/lensectomy/C3F8 gas (14%) OS 04/29/25   -previously discussed guarded visual prognosis    Today 06/16/25 POW5. Corneal changes healed and stable. Pt with chicken pox as a child, has not gotten shingles vaccine, denies cold sore hx  - today vision has improved CF temporally, but anterior capsule is diffusely opacified, retina appears attached  - Stop Valtrex 1g TID now completed   - has tapered off prednisolone as directed  - risks/benefits of yag capsulotomy vs PPV/IOL discussed, patient wished to proceed with surgical intervention  - IOL Biometry obtained today  - patient would like to proceed with surgeyr in mid-late August, will schedule once she provides use with a date        Proliferative diabetic retinopathy without macular edema in type II DM, right [id number]  S/p PPV EL (12 years ago)    - OCT 10/01/24   --- OD: Normal foveal countor, retinal thickness and retinal laminations.     #Plan#:   - Emphasized the importance of blood glucose, BP, and cholestrol level control  - Discussed risks/benefits/alteranatives of treatment options.   - Observe  - RTC in 3 months      Orx:  STOP Moxi QID OS   STOP Erythromycin carolina bid OS  STOP Valtrex   Atropine BID OS to at bedtime OS   Pred QID OS to BID/1 week then daily x 1 week     -

## 2025-07-07 ENCOUNTER — PATIENT MESSAGE (OUTPATIENT)
Dept: ENDOCRINOLOGY | Facility: CLINIC | Age: 50
End: 2025-07-07
Payer: COMMERCIAL

## 2025-07-07 DIAGNOSIS — E11.9 TYPE 2 DIABETES MELLITUS WITHOUT COMPLICATION, WITHOUT LONG-TERM CURRENT USE OF INSULIN: ICD-10-CM

## 2025-07-08 DIAGNOSIS — H26.492 LEFT POSTERIOR CAPSULAR OPACIFICATION: ICD-10-CM

## 2025-07-08 DIAGNOSIS — H33.22: Primary | ICD-10-CM

## 2025-07-09 DIAGNOSIS — E11.9 TYPE 2 DIABETES MELLITUS WITHOUT COMPLICATION, WITHOUT LONG-TERM CURRENT USE OF INSULIN: ICD-10-CM

## 2025-07-09 RX ORDER — SEMAGLUTIDE 1.34 MG/ML
1 INJECTION, SOLUTION SUBCUTANEOUS
Qty: 3 ML | Refills: 1 | Status: SHIPPED | OUTPATIENT
Start: 2025-07-13

## 2025-07-09 RX ORDER — SEMAGLUTIDE 1.34 MG/ML
1 INJECTION, SOLUTION SUBCUTANEOUS
Qty: 3 ML | Refills: 3 | OUTPATIENT
Start: 2025-07-13

## 2025-07-14 ENCOUNTER — PATIENT MESSAGE (OUTPATIENT)
Facility: CLINIC | Age: 50
End: 2025-07-14
Payer: COMMERCIAL

## 2025-07-14 DIAGNOSIS — E05.00 GRAVES DISEASE: Primary | ICD-10-CM

## 2025-07-19 LAB
ALBUMIN SERPL-MCNC: 4.3 G/DL (ref 3.6–5.1)
ALP SERPL-CCNC: 85 U/L (ref 37–153)
ALT SERPL-CCNC: 11 U/L (ref 6–29)
ANION GAP SERPL CALCULATED.4IONS-SCNC: 12 MMOL/L (CALC) (ref 7–17)
AST SERPL-CCNC: 18 U/L (ref 10–35)
BILIRUB SERPL-MCNC: 0.3 MG/DL (ref 0.2–1.2)
BUN SERPL-MCNC: 16 MG/DL (ref 7–25)
CALCIUM SERPL-MCNC: 9.7 MG/DL (ref 8.6–10.4)
CHLORIDE SERPL-SCNC: 101 MMOL/L (ref 98–110)
CO2 SERPL-SCNC: 26 MMOL/L (ref 20–32)
CREAT SERPL-MCNC: 0.55 MG/DL (ref 0.5–1.03)
EGFRCR SERPLBLD CKD-EPI 2021: 112 ML/MIN/1.73M2
GLUCOSE SERPL-MCNC: 90 MG/DL (ref 65–139)
POTASSIUM SERPL-SCNC: 4.5 MMOL/L (ref 3.5–5.3)
PROT SERPL-MCNC: 7.5 G/DL (ref 6.1–8.1)
SODIUM SERPL-SCNC: 139 MMOL/L (ref 135–146)
T3 SERPL-MCNC: 90 NG/DL (ref 76–181)
T4 FREE SERPL-MCNC: 1.1 NG/DL (ref 0.8–1.8)
TSH SERPL-ACNC: 0.66 MIU/L
TSI SER-ACNC: NORMAL

## 2025-07-22 LAB
ALBUMIN SERPL-MCNC: 4.3 G/DL (ref 3.6–5.1)
ALP SERPL-CCNC: 85 U/L (ref 37–153)
ALT SERPL-CCNC: 11 U/L (ref 6–29)
ANION GAP SERPL CALCULATED.4IONS-SCNC: 12 MMOL/L (CALC) (ref 7–17)
AST SERPL-CCNC: 18 U/L (ref 10–35)
BILIRUB SERPL-MCNC: 0.3 MG/DL (ref 0.2–1.2)
BUN SERPL-MCNC: 16 MG/DL (ref 7–25)
CALCIUM SERPL-MCNC: 9.7 MG/DL (ref 8.6–10.4)
CHLORIDE SERPL-SCNC: 101 MMOL/L (ref 98–110)
CO2 SERPL-SCNC: 26 MMOL/L (ref 20–32)
CREAT SERPL-MCNC: 0.55 MG/DL (ref 0.5–1.03)
EGFRCR SERPLBLD CKD-EPI 2021: 112 ML/MIN/1.73M2
GLUCOSE SERPL-MCNC: 90 MG/DL (ref 65–139)
POTASSIUM SERPL-SCNC: 4.5 MMOL/L (ref 3.5–5.3)
PROT SERPL-MCNC: 7.5 G/DL (ref 6.1–8.1)
SODIUM SERPL-SCNC: 139 MMOL/L (ref 135–146)
T3 SERPL-MCNC: 90 NG/DL (ref 76–181)
T4 FREE SERPL-MCNC: 1.1 NG/DL (ref 0.8–1.8)
TSH SERPL-ACNC: 0.66 MIU/L
TSI SER-ACNC: 354 % BASELINE

## 2025-08-04 ENCOUNTER — TELEPHONE (OUTPATIENT)
Dept: ENDOCRINOLOGY | Facility: CLINIC | Age: 50
End: 2025-08-04
Payer: COMMERCIAL

## 2025-08-04 DIAGNOSIS — E11.9 TYPE 2 DIABETES MELLITUS WITHOUT COMPLICATION, WITHOUT LONG-TERM CURRENT USE OF INSULIN: ICD-10-CM

## 2025-08-04 NOTE — TELEPHONE ENCOUNTER
Would you please refill my Jardiance prescription? I am totally out. My pharmacy is [facility] in [place].

## 2025-08-12 ENCOUNTER — APPOINTMENT (OUTPATIENT)
Facility: CLINIC | Age: 50
End: 2025-08-12
Payer: COMMERCIAL

## 2025-08-22 ENCOUNTER — ANESTHESIA EVENT (OUTPATIENT)
Dept: OPERATING ROOM | Facility: CLINIC | Age: 50
End: 2025-08-22
Payer: COMMERCIAL

## 2025-08-25 ENCOUNTER — TELEPHONE (OUTPATIENT)
Dept: OPHTHALMOLOGY | Facility: HOSPITAL | Age: 50
End: 2025-08-25

## 2025-08-25 ENCOUNTER — HOSPITAL ENCOUNTER (OUTPATIENT)
Facility: CLINIC | Age: 50
Setting detail: OUTPATIENT SURGERY
Discharge: HOME | End: 2025-08-25
Payer: COMMERCIAL

## 2025-08-25 ENCOUNTER — ANESTHESIA (OUTPATIENT)
Dept: OPERATING ROOM | Facility: CLINIC | Age: 50
End: 2025-08-25
Payer: COMMERCIAL

## 2025-08-25 DIAGNOSIS — H33.22: Primary | ICD-10-CM

## 2025-08-25 PROBLEM — E11.3522: Chronic | Status: ACTIVE | Noted: 2025-08-25

## 2025-08-25 PROBLEM — H26.492 LEFT POSTERIOR CAPSULAR OPACIFICATION: Status: ACTIVE | Noted: 2025-08-25

## 2025-08-25 PROCEDURE — A67036 PR VITRECTOMY,MECHANICAL: Performed by: NURSE ANESTHETIST, CERTIFIED REGISTERED

## 2025-08-25 PROCEDURE — A67036 PR VITRECTOMY,MECHANICAL: Performed by: ANESTHESIOLOGY

## 2025-08-25 PROCEDURE — 2500000004 HC RX 250 GENERAL PHARMACY W/ HCPCS (ALT 636 FOR OP/ED): Performed by: NURSE ANESTHETIST, CERTIFIED REGISTERED

## 2025-08-25 RX ORDER — PROPOFOL 10 MG/ML
INJECTION, EMULSION INTRAVENOUS AS NEEDED
Status: DISCONTINUED | OUTPATIENT
Start: 2025-08-25 | End: 2025-08-25

## 2025-08-25 RX ORDER — LIDOCAINE HCL/PF 100 MG/5ML
SYRINGE (ML) INTRAVENOUS AS NEEDED
Status: DISCONTINUED | OUTPATIENT
Start: 2025-08-25 | End: 2025-08-25

## 2025-08-25 RX ORDER — SODIUM CHLORIDE, SODIUM LACTATE, POTASSIUM CHLORIDE, CALCIUM CHLORIDE 600; 310; 30; 20 MG/100ML; MG/100ML; MG/100ML; MG/100ML
INJECTION, SOLUTION INTRAVENOUS CONTINUOUS PRN
Status: DISCONTINUED | OUTPATIENT
Start: 2025-08-25 | End: 2025-08-25

## 2025-08-25 RX ORDER — MIDAZOLAM HYDROCHLORIDE 1 MG/ML
INJECTION, SOLUTION INTRAMUSCULAR; INTRAVENOUS AS NEEDED
Status: DISCONTINUED | OUTPATIENT
Start: 2025-08-25 | End: 2025-08-25

## 2025-08-25 RX ORDER — FENTANYL CITRATE 50 UG/ML
INJECTION, SOLUTION INTRAMUSCULAR; INTRAVENOUS AS NEEDED
Status: DISCONTINUED | OUTPATIENT
Start: 2025-08-25 | End: 2025-08-25

## 2025-08-25 RX ORDER — ONDANSETRON HYDROCHLORIDE 2 MG/ML
INJECTION, SOLUTION INTRAVENOUS AS NEEDED
Status: DISCONTINUED | OUTPATIENT
Start: 2025-08-25 | End: 2025-08-25

## 2025-08-25 RX ADMIN — MIDAZOLAM 1 MG: 1 INJECTION INTRAMUSCULAR; INTRAVENOUS at 13:43

## 2025-08-25 RX ADMIN — FENTANYL CITRATE 50 MCG: 50 INJECTION, SOLUTION INTRAMUSCULAR; INTRAVENOUS at 14:02

## 2025-08-25 RX ADMIN — PROPOFOL 40 MG: 10 INJECTION, EMULSION INTRAVENOUS at 13:51

## 2025-08-25 RX ADMIN — ONDANSETRON 4 MG: 2 INJECTION, SOLUTION INTRAMUSCULAR; INTRAVENOUS at 14:55

## 2025-08-25 RX ADMIN — FENTANYL CITRATE 50 MCG: 50 INJECTION, SOLUTION INTRAMUSCULAR; INTRAVENOUS at 13:51

## 2025-08-25 RX ADMIN — SODIUM CHLORIDE, POTASSIUM CHLORIDE, SODIUM LACTATE AND CALCIUM CHLORIDE: 600; 310; 30; 20 INJECTION, SOLUTION INTRAVENOUS at 13:43

## 2025-08-25 RX ADMIN — LIDOCAINE HYDROCHLORIDE 60 MG: 20 INJECTION, SOLUTION INTRAVENOUS at 13:51

## 2025-08-25 RX ADMIN — MIDAZOLAM 1 MG: 1 INJECTION INTRAMUSCULAR; INTRAVENOUS at 13:50

## 2025-08-25 ASSESSMENT — PAIN SCALES - GENERAL
PAINLEVEL_OUTOF10: 0 - NO PAIN
PAINLEVEL_OUTOF10: 5 - MODERATE PAIN
PAINLEVEL_OUTOF10: 5 - MODERATE PAIN
PAINLEVEL_OUTOF10: 3

## 2025-08-25 ASSESSMENT — PAIN - FUNCTIONAL ASSESSMENT
PAIN_FUNCTIONAL_ASSESSMENT: 0-10

## 2025-08-26 ENCOUNTER — OFFICE VISIT (OUTPATIENT)
Dept: OPHTHALMOLOGY | Facility: CLINIC | Age: 50
End: 2025-08-26
Payer: COMMERCIAL

## 2025-08-26 ENCOUNTER — HOSPITAL ENCOUNTER (EMERGENCY)
Facility: HOSPITAL | Age: 50
Discharge: HOME | End: 2025-08-26
Attending: EMERGENCY MEDICINE
Payer: COMMERCIAL

## 2025-08-26 ENCOUNTER — APPOINTMENT (OUTPATIENT)
Facility: CLINIC | Age: 50
End: 2025-08-26
Payer: COMMERCIAL

## 2025-08-26 DIAGNOSIS — S05.02XA ABRASION OF LEFT CORNEA, INITIAL ENCOUNTER: ICD-10-CM

## 2025-08-26 DIAGNOSIS — H27.02 APHAKIA OF LEFT EYE: Primary | ICD-10-CM

## 2025-08-26 PROCEDURE — 99024 POSTOP FOLLOW-UP VISIT: CPT

## 2025-08-26 RX ORDER — ERYTHROMYCIN 5 MG/G
OINTMENT OPHTHALMIC ONCE
Qty: 3.5 G | Refills: 1 | Status: SHIPPED | OUTPATIENT
Start: 2025-08-26 | End: 2025-08-27

## 2025-08-26 RX ORDER — ERYTHROMYCIN 5 MG/G
OINTMENT OPHTHALMIC NIGHTLY
Qty: 3.5 G | Refills: 1 | Status: CANCELLED | OUTPATIENT
Start: 2025-08-26 | End: 2025-09-05

## 2025-08-26 ASSESSMENT — ENCOUNTER SYMPTOMS
CONSTITUTIONAL NEGATIVE: 0
CARDIOVASCULAR NEGATIVE: 0
GASTROINTESTINAL NEGATIVE: 0
ALLERGIC/IMMUNOLOGIC NEGATIVE: 0
ENDOCRINE NEGATIVE: 0
MUSCULOSKELETAL NEGATIVE: 0
PSYCHIATRIC NEGATIVE: 0
EYES NEGATIVE: 1
NEUROLOGICAL NEGATIVE: 0
HEMATOLOGIC/LYMPHATIC NEGATIVE: 0
RESPIRATORY NEGATIVE: 0

## 2025-08-26 ASSESSMENT — VISUAL ACUITY
OS_SC: NLP?
METHOD: SNELLEN - LINEAR

## 2025-08-26 ASSESSMENT — EXTERNAL EXAM - LEFT EYE: OS_EXAM: NORMAL

## 2025-08-26 ASSESSMENT — PAIN DESCRIPTION - LOCATION: LOCATION: EYE

## 2025-08-26 ASSESSMENT — CUP TO DISC RATIO: OS_RATIO: 0.3

## 2025-08-26 ASSESSMENT — PAIN SCALES - GENERAL: PAINLEVEL_OUTOF10: 10 - WORST POSSIBLE PAIN

## 2025-08-26 ASSESSMENT — SLIT LAMP EXAM - LIDS: COMMENTS: NORMAL

## 2025-08-26 ASSESSMENT — PAIN - FUNCTIONAL ASSESSMENT: PAIN_FUNCTIONAL_ASSESSMENT: 0-10

## 2025-08-26 ASSESSMENT — PAIN DESCRIPTION - ORIENTATION: ORIENTATION: LEFT

## 2025-08-27 RX ORDER — ERYTHROMYCIN 5 MG/G
OINTMENT OPHTHALMIC NIGHTLY
Qty: 3.5 G | Refills: 1 | Status: SHIPPED | OUTPATIENT
Start: 2025-08-27 | End: 2025-09-10

## 2025-08-29 ENCOUNTER — OFFICE VISIT (OUTPATIENT)
Dept: OPHTHALMOLOGY | Facility: CLINIC | Age: 50
End: 2025-08-29
Payer: COMMERCIAL

## 2025-08-29 ENCOUNTER — APPOINTMENT (OUTPATIENT)
Dept: ENDOCRINOLOGY | Facility: CLINIC | Age: 50
End: 2025-08-29
Payer: COMMERCIAL

## 2025-08-29 DIAGNOSIS — H33.22: Primary | ICD-10-CM

## 2025-08-29 PROCEDURE — 99024 POSTOP FOLLOW-UP VISIT: CPT

## 2025-08-29 ASSESSMENT — ENCOUNTER SYMPTOMS
CARDIOVASCULAR NEGATIVE: 0
RESPIRATORY NEGATIVE: 0
NEUROLOGICAL NEGATIVE: 0
MUSCULOSKELETAL NEGATIVE: 0
GASTROINTESTINAL NEGATIVE: 0
EYES NEGATIVE: 1
ENDOCRINE NEGATIVE: 0
ALLERGIC/IMMUNOLOGIC NEGATIVE: 0
PSYCHIATRIC NEGATIVE: 0
CONSTITUTIONAL NEGATIVE: 0
HEMATOLOGIC/LYMPHATIC NEGATIVE: 0

## 2025-08-29 ASSESSMENT — SLIT LAMP EXAM - LIDS: COMMENTS: NORMAL

## 2025-08-29 ASSESSMENT — VISUAL ACUITY
OS_SC: HM
METHOD: SNELLEN - LINEAR

## 2025-08-29 ASSESSMENT — CUP TO DISC RATIO: OS_RATIO: 0.3

## 2025-08-29 ASSESSMENT — EXTERNAL EXAM - LEFT EYE: OS_EXAM: NORMAL

## 2025-09-02 ENCOUNTER — TELEPHONE (OUTPATIENT)
Dept: ENDOCRINOLOGY | Facility: CLINIC | Age: 50
End: 2025-09-02
Payer: COMMERCIAL

## 2025-09-02 DIAGNOSIS — E11.9 TYPE 2 DIABETES MELLITUS WITHOUT COMPLICATION, WITHOUT LONG-TERM CURRENT USE OF INSULIN: ICD-10-CM

## 2025-09-02 RX ORDER — SEMAGLUTIDE 1.34 MG/ML
1 INJECTION, SOLUTION SUBCUTANEOUS
Qty: 9 ML | Refills: 2 | Status: SHIPPED | OUTPATIENT
Start: 2025-09-02

## 2025-09-03 ENCOUNTER — APPOINTMENT (OUTPATIENT)
Dept: OPHTHALMOLOGY | Facility: CLINIC | Age: 50
End: 2025-09-03
Payer: COMMERCIAL

## 2025-09-05 ENCOUNTER — APPOINTMENT (OUTPATIENT)
Dept: OPHTHALMOLOGY | Facility: CLINIC | Age: 50
End: 2025-09-05
Payer: COMMERCIAL

## 2025-09-26 ENCOUNTER — APPOINTMENT (OUTPATIENT)
Dept: OPHTHALMOLOGY | Facility: CLINIC | Age: 50
End: 2025-09-26
Payer: COMMERCIAL

## 2025-10-09 ENCOUNTER — APPOINTMENT (OUTPATIENT)
Dept: OPHTHALMOLOGY | Facility: CLINIC | Age: 50
End: 2025-10-09
Payer: COMMERCIAL

## 2026-05-07 ENCOUNTER — APPOINTMENT (OUTPATIENT)
Dept: ENDOCRINOLOGY | Facility: CLINIC | Age: 51
End: 2026-05-07
Payer: COMMERCIAL

## 2026-09-29 ENCOUNTER — APPOINTMENT (OUTPATIENT)
Dept: ENDOCRINOLOGY | Facility: CLINIC | Age: 51
End: 2026-09-29
Payer: COMMERCIAL

## (undated) DEVICE — NEEDLE, HYPODERMIC, REGULAR WALL, REGULAR BEVEL, 30 G X 0.5 IN

## (undated) DEVICE — SYRINGE, 1 CC, LUER LOCK

## (undated) DEVICE — NEEDLE, ARTERIAL/VENOUS, BLUNT FILL, 18 G X 1.5 IN

## (undated) DEVICE — NEEDLE, RETROBULBAR, 23G X 8MM

## (undated) DEVICE — SYRINGE, 5 CC, LUER LOCK

## (undated) DEVICE — CHANDELIER, 25G

## (undated) DEVICE — NEEDLE, HYPODERMIC, REGULAR WALL, REGULAR BEVEL, 18 G X 1.5 IN

## (undated) DEVICE — TOWEL, SURGICAL, NEURO, O/R, 16 X 26, BLUE, STERILE

## (undated) DEVICE — BIOM, OPTIC, HD PROFESSIONAL F/F=200MM

## (undated) DEVICE — Device

## (undated) DEVICE — SYRINGE, 10 CC, LUER LOCK

## (undated) DEVICE — AUTO GAS FULL, CONSTELLATION

## (undated) DEVICE — 25GA MAXGRIP FORCEPS, DISPOSABLE

## (undated) DEVICE — LABELS, OR GENERAL, W/MARKER

## (undated) DEVICE — SCISSORS, 25G, HORIZONTAL, CURVED

## (undated) DEVICE — DRESSING, TRANSPARENT, TEGADERM, 4 X 4-3/4 IN

## (undated) DEVICE — COVER HANDLE LIGHT, STERIS, BLUE, STERILE

## (undated) DEVICE — PROBE, DIATHERMY 25G

## (undated) DEVICE — 25+GA HYPERVIT BEVEL 20K CPM WIDE TOTAL PLUS VITRECTOMY PAK

## (undated) DEVICE — GLOVE, SURGICAL, PROTEXIS PI , 7.5, PF, LF

## (undated) DEVICE — PROBE, 25G ILLUMINATED FLEX CURVED LASER 2X W/RFID